# Patient Record
Sex: MALE | Race: WHITE | NOT HISPANIC OR LATINO | Employment: OTHER | URBAN - METROPOLITAN AREA
[De-identification: names, ages, dates, MRNs, and addresses within clinical notes are randomized per-mention and may not be internally consistent; named-entity substitution may affect disease eponyms.]

---

## 2022-03-16 DIAGNOSIS — Z00.6 EXAMINATION FOR NORMAL COMPARISON FOR CLINICAL RESEARCH: Primary | ICD-10-CM

## 2022-03-21 ENCOUNTER — OFFICE VISIT (OUTPATIENT)
Dept: CARDIAC SURGERY | Facility: CLINIC | Age: 82
End: 2022-03-21

## 2022-03-21 VITALS
DIASTOLIC BLOOD PRESSURE: 50 MMHG | SYSTOLIC BLOOD PRESSURE: 128 MMHG | BODY MASS INDEX: 27.63 KG/M2 | HEIGHT: 70 IN | WEIGHT: 193 LBS | HEART RATE: 61 BPM | TEMPERATURE: 98.4 F | OXYGEN SATURATION: 98 %

## 2022-03-21 DIAGNOSIS — R26.89 LOSS OF BALANCE: ICD-10-CM

## 2022-03-21 DIAGNOSIS — I35.9 AORTIC VALVE DISORDER: Primary | ICD-10-CM

## 2022-03-21 DIAGNOSIS — I50.9 PLEURAL EFFUSION DUE TO CHF (CONGESTIVE HEART FAILURE): ICD-10-CM

## 2022-03-21 DIAGNOSIS — I35.0 AORTIC STENOSIS, SEVERE: ICD-10-CM

## 2022-03-21 DIAGNOSIS — I50.9 CHRONIC CONGESTIVE HEART FAILURE, UNSPECIFIED HEART FAILURE TYPE: ICD-10-CM

## 2022-03-21 DIAGNOSIS — I42.9 CARDIOMYOPATHY, UNSPECIFIED TYPE: Primary | ICD-10-CM

## 2022-03-21 PROCEDURE — 99204 OFFICE O/P NEW MOD 45 MIN: CPT | Performed by: THORACIC SURGERY (CARDIOTHORACIC VASCULAR SURGERY)

## 2022-03-21 RX ORDER — TAMSULOSIN HYDROCHLORIDE 0.4 MG/1
1 CAPSULE ORAL DAILY
COMMUNITY
Start: 2022-03-12 | End: 2022-06-22 | Stop reason: ALTCHOICE

## 2022-03-21 RX ORDER — TORSEMIDE 20 MG/1
20 TABLET ORAL DAILY PRN
COMMUNITY
Start: 2022-03-02

## 2022-03-21 RX ORDER — METOPROLOL SUCCINATE 50 MG/1
50 TABLET, EXTENDED RELEASE ORAL DAILY
COMMUNITY
Start: 2022-03-02 | End: 2022-05-17 | Stop reason: HOSPADM

## 2022-03-21 RX ORDER — SITAGLIPTIN 100 MG/1
100 TABLET, FILM COATED ORAL DAILY
COMMUNITY
Start: 2022-01-18

## 2022-03-21 RX ORDER — ATORVASTATIN CALCIUM 20 MG/1
TABLET, FILM COATED ORAL DAILY
Status: ON HOLD | COMMUNITY
Start: 2022-03-02 | End: 2022-04-08 | Stop reason: SDUPTHER

## 2022-03-21 RX ORDER — POTASSIUM CHLORIDE 750 MG/1
10 TABLET, FILM COATED, EXTENDED RELEASE ORAL AS NEEDED
COMMUNITY
Start: 2022-03-03

## 2022-03-21 RX ORDER — LEVOTHYROXINE SODIUM 0.07 MG/1
75 TABLET ORAL DAILY
COMMUNITY

## 2022-03-21 RX ORDER — SACUBITRIL AND VALSARTAN 24; 26 MG/1; MG/1
TABLET, FILM COATED ORAL 2 TIMES DAILY
COMMUNITY
Start: 2022-03-03 | End: 2022-04-08 | Stop reason: HOSPADM

## 2022-03-21 RX ORDER — OMEPRAZOLE 40 MG/1
40 CAPSULE, DELAYED RELEASE ORAL DAILY PRN
COMMUNITY
Start: 2022-02-07

## 2022-03-21 NOTE — PROGRESS NOTES
TAVR Pre-Op Checklist    Patient Name: Zack Alves y.o. [id number]  Residence: [place]    Referral Date: 3/21/22 : 1940   Ht: 70in (177.8 cm) Wt: 192# (87.5 kg)    Referring Cardiologist: AMBERLY Dao BMI: 27.69 kg/m2    Initial TTE date:EXTERNAL MEDICAL RECORDS - SCAN - ECHO REPORT 2021 [facility] (2022)    RADHA: 0.99 cm2  AV mean: 28 mmHg AV Vamx: 3.35  LVEF: 35-40%    Coordinator H&P:Progress Notes by Hortensia Courtney APRN (2022 11:00)    CT Surgery Consult:Progress Notes by Percy Green MD (2022 13:30)    STS Score: 2.04% Creatinine Clearance: 45 ml/min   Functional Assessment: C    Allergy (Contrast):Doxycycline and Levaquin [levofloxacin]  Pre-op meds: NA     Anticoagulated: No Last dose:NA Heparin or Lovenox Bridge: NA    CTA Date: CT Angio TAVR Chest Abdomen Pelvis (2022 12:59)    CTA 3D: CARDIOLOGY VISIT - SCAN - TAVR 3D MENSIO (2022)    Left Coronary Ht: 17.3  Right Coronary Ht: 18.5  Annulus Area: 551.6 mm2    CTA Important findings: Ascending aorta 4.6 cm, pancreatic ductal dilation and cystic changes.  MRI abdomen with pancreatic protocol scheduled for 22.    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3  PLTS:[id number]    P2Y12:236 HGA1C: 7.3 CXR:heart enlarged with mild chronic interstitial changes      TAVR Procedure Date: 5/16/22

## 2022-03-21 NOTE — PROGRESS NOTES
03/21/2022  Patient Information  [place] [address]   1940  'PCP/Referring Physician'  Provider, No Known  None  No ref. provider found    Chief Complaint   Patient presents with   • Consult     NP Belia Lopez PA-C for Valve Disease and Pleural Effusion-Complains of SOB       History of Present Illness:   The patient is an 81-year-old male who has been referred for evaluation of severe aortic stenosis.  1 year ago his mean gradient was about 28 mmHg with a peak in the 40s.  He has developed congestive heart failure and more shortness of breath. He also has had pleural effusions.  His symptoms have been more consistent with congestive heart failure than pneumonia.  He was started on Entresto and has felt much better.  He has been referred for consideration of TAVR at this time.      Patient Active Problem List   Diagnosis   • Aortic valve disorder   • Pleural effusion due to CHF (congestive heart failure) ([facility])     Past Medical History:   Diagnosis Date   • Anxiety    • Bleeding ulcer    • CHF (congestive heart failure) ([facility])    • Depression    • Diabetes mellitus ([facility])    • GERD (gastroesophageal reflux disease)    • Hemorrhoid    • History of blood transfusion    • Hyperlipidemia    • Hypertension    • Hypothyroidism    • Pleural effusion      Past Surgical History:   Procedure Laterality Date   • GALLBLADDER SURGERY     • UMBILICAL HERNIA REPAIR         Current Outpatient Medications:   •  atorvastatin (LIPITOR) 20 MG tablet, Daily., Disp: , Rfl:   •  Entresto 24-26 MG tablet, 2 (Two) Times a Day., Disp: , Rfl:   •  Januvia 100 MG tablet, Daily., Disp: , Rfl:   •  levothyroxine (SYNTHROID, LEVOTHROID) 75 MCG tablet, Take 75 mcg by mouth Daily., Disp: , Rfl:   •  metoprolol succinate XL (TOPROL-XL) 50 MG 24 hr tablet, Daily., Disp: , Rfl:   •  omeprazole (priLOSEC) 40 MG capsule, Daily., Disp:  , Rfl:   •  potassium chloride 10 MEQ CR tablet, Take 10 mEq by mouth Daily., Disp: , Rfl:   •  tamsulosin (FLOMAX) 0.4 MG capsule 24 hr capsule, Daily., Disp: , Rfl:   •  torsemide (DEMADEX) 10 MG tablet, Daily., Disp: , Rfl:   Allergies   Allergen Reactions   • Doxycycline Other (See Comments)     Uknown   • Levaquin [Levofloxacin] Swelling     Social History     Socioeconomic History   • Marital status:    • Number of children: 1   Tobacco Use   • Smoking status: Former Smoker     Packs/day: 0.25     Years: 0.50     Pack years: 0.12     Quit date:      Years since quittin.2   • Smokeless tobacco: Never Used   Vaping Use   • Vaping Use: Never used   Substance and Sexual Activity   • Alcohol use: Not Currently   • Drug use: Never     Family History   Problem Relation Age of Onset   • Heart attack Mother    • Ulcers Father      Review of Systems   Constitutional: Negative for chills, fever, malaise/fatigue, night sweats and weight loss.   HENT: Negative for hearing loss, odynophagia and sore throat.    Cardiovascular: Positive for chest pain, dyspnea on exertion and leg swelling. Negative for orthopnea and palpitations.   Respiratory: Positive for shortness of breath and wheezing. Negative for cough and hemoptysis.    Endocrine: Negative for cold intolerance, heat intolerance, polydipsia, polyphagia and polyuria.   Hematologic/Lymphatic: Bruises/bleeds easily.   Skin: Negative for itching and rash.   Musculoskeletal: Positive for back pain. Negative for joint pain, joint swelling and myalgias.   Gastrointestinal: Positive for diarrhea. Negative for abdominal pain, constipation, hematemesis, hematochezia, melena, nausea and vomiting.   Genitourinary: Negative for dysuria, frequency and hematuria.   Neurological: Negative for focal weakness, headaches, numbness and seizures.   Psychiatric/Behavioral: Positive for depression. Negative for suicidal ideas. The patient is nervous/anxious.    All other  "systems reviewed and are negative.    Vitals:    03/21/22 1348   BP: 128/50   BP Location: Left arm   Patient Position: Sitting   Pulse: 61   Temp: 98.4 °F (36.9 °C)   SpO2: 98%   Weight: 87.5 kg (193 lb)   Height: 177.8 cm (70\")      Physical Exam  Vitals and nursing note reviewed.   Constitutional:       General: He is not in acute distress.     Appearance: He is well-developed.   HENT:      Head: Normocephalic.   Eyes:      Conjunctiva/sclera: Conjunctivae normal.      Pupils: Pupils are equal, round, and reactive to light.   Neck:      Thyroid: No thyroid mass or thyromegaly.   Cardiovascular:      Rate and Rhythm: Normal rate.      Heart sounds: Murmur heard.     No friction rub. No gallop.   Pulmonary:      Breath sounds: No wheezing, rhonchi or rales.   Abdominal:      General: Bowel sounds are normal. There is no distension.      Palpations: Abdomen is soft. There is no mass.      Tenderness: There is no abdominal tenderness.   Musculoskeletal:         General: No deformity. Normal range of motion.      Cervical back: Normal range of motion.   Skin:     Findings: No petechiae.      Nails: There is no clubbing.   Neurological:      Mental Status: He is oriented to person, place, and time.      Cranial Nerves: No cranial nerve deficit.      Sensory: No sensory deficit.   Psychiatric:         Behavior: Behavior normal.         The ROS, past medical history, surgical history, family history, social history and vitals were reviewed by myself and corrected as needed.      Labs/Imaging:  I have obtained and reviewed the medical records from [facility], including the echocardiogram demonstrating congestive heart failure.    Assessment/Plan:   The patient is an 81-year-old male who has developed increasing symptoms of congestive heart failure.  He has evidence of at least, moderate if not severe, aortic stenosis.  He has a very loud murmur.  A year ago his mean gradient was approaching 30.  I suspect he has severe " aortic stenosis.  We will proceed with a work-up for him for a possible TAVR evaluation.  I discussed this fully with him and he is agreeable.  He has no further questions.  I would like to thank you for this consultation.    Patient Active Problem List   Diagnosis   • Aortic valve disorder   • Pleural effusion due to CHF (congestive heart failure) ([facility])       CC: JESSICA Acuña editing for Percy Green MD      I, Percy Green MD, have read and agree with the editing done by Julianne Torres, .

## 2022-03-22 PROBLEM — I50.9 PLEURAL EFFUSION DUE TO CHF (CONGESTIVE HEART FAILURE): Status: ACTIVE | Noted: 2022-03-22

## 2022-03-22 PROBLEM — I35.9 AORTIC VALVE DISORDER: Status: ACTIVE | Noted: 2022-03-22

## 2022-03-31 PROBLEM — I42.9 CARDIOMYOPATHY: Status: ACTIVE | Noted: 2022-03-31

## 2022-03-31 PROBLEM — I35.0 AORTIC STENOSIS, SEVERE: Status: ACTIVE | Noted: 2022-03-31

## 2022-04-04 ENCOUNTER — PREP FOR SURGERY (OUTPATIENT)
Dept: OTHER | Facility: HOSPITAL | Age: 82
End: 2022-04-04

## 2022-04-04 DIAGNOSIS — R79.9 ABNORMAL FINDING OF BLOOD CHEMISTRY, UNSPECIFIED: ICD-10-CM

## 2022-04-04 DIAGNOSIS — I35.0 AORTIC STENOSIS, SEVERE: Primary | ICD-10-CM

## 2022-04-04 RX ORDER — SODIUM CHLORIDE 0.9 % (FLUSH) 0.9 %
3-10 SYRINGE (ML) INJECTION AS NEEDED
Status: CANCELLED | OUTPATIENT
Start: 2022-04-04

## 2022-04-04 RX ORDER — SODIUM CHLORIDE 9 MG/ML
100 INJECTION, SOLUTION INTRAVENOUS CONTINUOUS
Status: CANCELLED | OUTPATIENT
Start: 2022-04-04 | End: 2022-04-04

## 2022-04-04 RX ORDER — SODIUM CHLORIDE 0.9 % (FLUSH) 0.9 %
3 SYRINGE (ML) INJECTION EVERY 12 HOURS SCHEDULED
Status: CANCELLED | OUTPATIENT
Start: 2022-04-04

## 2022-04-05 ENCOUNTER — CLINICAL SUPPORT NO REQUIREMENTS (OUTPATIENT)
Dept: PREADMISSION TESTING | Facility: HOSPITAL | Age: 82
End: 2022-04-05

## 2022-04-05 DIAGNOSIS — I35.0 AORTIC STENOSIS, SEVERE: ICD-10-CM

## 2022-04-05 LAB
FLUAV RNA RESP QL NAA+PROBE: NOT DETECTED
FLUBV RNA RESP QL NAA+PROBE: NOT DETECTED
RSV RNA NPH QL NAA+NON-PROBE: NOT DETECTED
SARS-COV-2 RNA RESP QL NAA+PROBE: NOT DETECTED

## 2022-04-05 PROCEDURE — 87637 SARSCOV2&INF A&B&RSV AMP PRB: CPT

## 2022-04-05 PROCEDURE — C9803 HOPD COVID-19 SPEC COLLECT: HCPCS

## 2022-04-08 ENCOUNTER — HOSPITAL ENCOUNTER (OUTPATIENT)
Facility: HOSPITAL | Age: 82
Setting detail: HOSPITAL OUTPATIENT SURGERY
Discharge: HOME | End: 2022-04-08
Attending: INTERNAL MEDICINE | Admitting: INTERNAL MEDICINE

## 2022-04-08 ENCOUNTER — HOSPITAL ENCOUNTER (OUTPATIENT)
Dept: CARDIOLOGY | Facility: HOSPITAL | Age: 82
Discharge: HOME | End: 2022-04-08

## 2022-04-08 VITALS
TEMPERATURE: 98 F | WEIGHT: 192.2 LBS | BODY MASS INDEX: 27.52 KG/M2 | SYSTOLIC BLOOD PRESSURE: 100 MMHG | DIASTOLIC BLOOD PRESSURE: 62 MMHG | RESPIRATION RATE: 16 BRPM | HEART RATE: 74 BPM | HEIGHT: 70 IN | OXYGEN SATURATION: 96 %

## 2022-04-08 DIAGNOSIS — I42.9 CARDIOMYOPATHY, UNSPECIFIED TYPE: ICD-10-CM

## 2022-04-08 DIAGNOSIS — I42.0 DILATED CARDIOMYOPATHY: ICD-10-CM

## 2022-04-08 DIAGNOSIS — I35.0 AORTIC STENOSIS, SEVERE: ICD-10-CM

## 2022-04-08 DIAGNOSIS — R94.31 ABNORMAL ELECTROCARDIOGRAM (ECG) (EKG): ICD-10-CM

## 2022-04-08 DIAGNOSIS — I50.9 PLEURAL EFFUSION DUE TO CHF (CONGESTIVE HEART FAILURE): ICD-10-CM

## 2022-04-08 DIAGNOSIS — I50.9 CHRONIC CONGESTIVE HEART FAILURE, UNSPECIFIED HEART FAILURE TYPE: ICD-10-CM

## 2022-04-08 DIAGNOSIS — I35.9 AORTIC VALVE DISORDER: Primary | ICD-10-CM

## 2022-04-08 LAB
ANION GAP SERPL CALCULATED.3IONS-SCNC: 12 MMOL/L (ref 5–15)
BASOPHILS # BLD AUTO: 0.02 10*3/MM3 (ref 0–0.2)
BASOPHILS NFR BLD AUTO: 0.2 % (ref 0–1.5)
BUN SERPL-MCNC: 29 MG/DL (ref 8–23)
BUN/CREAT SERPL: 18.5 (ref 7–25)
CALCIUM SPEC-SCNC: 9.3 MG/DL (ref 8.6–10.5)
CHLORIDE SERPL-SCNC: 105 MMOL/L (ref 98–107)
CHOLEST SERPL-MCNC: 199 MG/DL (ref 0–200)
CO2 SERPL-SCNC: 26 MMOL/L (ref 22–29)
CREAT SERPL-MCNC: 1.57 MG/DL (ref 0.76–1.27)
DEPRECATED RDW RBC AUTO: 46.8 FL (ref 37–54)
EGFRCR SERPLBLD CKD-EPI 2021: 43.7 ML/MIN/1.73
EOSINOPHIL # BLD AUTO: 0.1 10*3/MM3 (ref 0–0.4)
EOSINOPHIL NFR BLD AUTO: 1.2 % (ref 0.3–6.2)
ERYTHROCYTE [DISTWIDTH] IN BLOOD BY AUTOMATED COUNT: 13.2 % (ref 12.3–15.4)
GLUCOSE SERPL-MCNC: 143 MG/DL (ref 65–99)
HCT VFR BLD AUTO: 44.2 % (ref 37.5–51)
HDLC SERPL-MCNC: 54 MG/DL (ref 40–60)
HGB BLD-MCNC: 14.5 G/DL (ref 13–17.7)
IMM GRANULOCYTES # BLD AUTO: 0.02 10*3/MM3 (ref 0–0.05)
IMM GRANULOCYTES NFR BLD AUTO: 0.2 % (ref 0–0.5)
LDLC SERPL CALC-MCNC: 133 MG/DL (ref 0–100)
LDLC/HDLC SERPL: 2.45 {RATIO}
LYMPHOCYTES # BLD AUTO: 1.59 10*3/MM3 (ref 0.7–3.1)
LYMPHOCYTES NFR BLD AUTO: 19.6 % (ref 19.6–45.3)
MAGNESIUM SERPL-MCNC: 2.1 MG/DL (ref 1.6–2.4)
MCH RBC QN AUTO: 31.4 PG (ref 26.6–33)
MCHC RBC AUTO-ENTMCNC: 32.8 G/DL (ref 31.5–35.7)
MCV RBC AUTO: 95.7 FL (ref 79–97)
MONOCYTES # BLD AUTO: 0.38 10*3/MM3 (ref 0.1–0.9)
MONOCYTES NFR BLD AUTO: 4.7 % (ref 5–12)
NEUTROPHILS NFR BLD AUTO: 6.01 10*3/MM3 (ref 1.7–7)
NEUTROPHILS NFR BLD AUTO: 74.1 % (ref 42.7–76)
NRBC BLD AUTO-RTO: 0 /100 WBC (ref 0–0.2)
PLATELET # BLD AUTO: 171 10*3/MM3 (ref 140–450)
PMV BLD AUTO: 11.4 FL (ref 6–12)
POTASSIUM SERPL-SCNC: 4.2 MMOL/L (ref 3.5–5.2)
RBC # BLD AUTO: 4.62 10*6/MM3 (ref 4.14–5.8)
SODIUM SERPL-SCNC: 143 MMOL/L (ref 136–145)
TRIGL SERPL-MCNC: 63 MG/DL (ref 0–150)
VLDLC SERPL-MCNC: 12 MG/DL (ref 5–40)
WBC NRBC COR # BLD: 8.12 10*3/MM3 (ref 3.4–10.8)

## 2022-04-08 PROCEDURE — 93312 ECHO TRANSESOPHAGEAL: CPT | Performed by: INTERNAL MEDICINE

## 2022-04-08 PROCEDURE — 85025 COMPLETE CBC W/AUTO DIFF WBC: CPT | Performed by: NURSE PRACTITIONER

## 2022-04-08 PROCEDURE — 93454 CORONARY ARTERY ANGIO S&I: CPT | Performed by: INTERNAL MEDICINE

## 2022-04-08 PROCEDURE — 80061 LIPID PANEL: CPT | Performed by: NURSE PRACTITIONER

## 2022-04-08 PROCEDURE — 99204 OFFICE O/P NEW MOD 45 MIN: CPT | Performed by: INTERNAL MEDICINE

## 2022-04-08 PROCEDURE — 93325 DOPPLER ECHO COLOR FLOW MAPG: CPT

## 2022-04-08 PROCEDURE — 83735 ASSAY OF MAGNESIUM: CPT | Performed by: NURSE PRACTITIONER

## 2022-04-08 PROCEDURE — C1894 INTRO/SHEATH, NON-LASER: HCPCS | Performed by: INTERNAL MEDICINE

## 2022-04-08 PROCEDURE — 25010000002 FENTANYL CITRATE (PF) 50 MCG/ML SOLUTION: Performed by: INTERNAL MEDICINE

## 2022-04-08 PROCEDURE — 93325 DOPPLER ECHO COLOR FLOW MAPG: CPT | Performed by: INTERNAL MEDICINE

## 2022-04-08 PROCEDURE — 80048 BASIC METABOLIC PNL TOTAL CA: CPT | Performed by: NURSE PRACTITIONER

## 2022-04-08 PROCEDURE — 93320 DOPPLER ECHO COMPLETE: CPT | Performed by: INTERNAL MEDICINE

## 2022-04-08 PROCEDURE — 99152 MOD SED SAME PHYS/QHP 5/>YRS: CPT | Performed by: INTERNAL MEDICINE

## 2022-04-08 PROCEDURE — 93320 DOPPLER ECHO COMPLETE: CPT

## 2022-04-08 PROCEDURE — 0 IOPAMIDOL PER 1 ML: Performed by: INTERNAL MEDICINE

## 2022-04-08 PROCEDURE — 93312 ECHO TRANSESOPHAGEAL: CPT

## 2022-04-08 PROCEDURE — 25010000002 MIDAZOLAM PER 1 MG: Performed by: INTERNAL MEDICINE

## 2022-04-08 RX ORDER — SACUBITRIL AND VALSARTAN 49; 51 MG/1; MG/1
1 TABLET, FILM COATED ORAL 2 TIMES DAILY
Qty: 60 TABLET | Refills: 11 | Status: ON HOLD | OUTPATIENT
Start: 2022-04-08 | End: 2022-05-17 | Stop reason: SDUPTHER

## 2022-04-08 RX ORDER — LOSARTAN POTASSIUM 50 MG/1
50 TABLET ORAL DAILY
COMMUNITY
End: 2022-04-08 | Stop reason: HOSPADM

## 2022-04-08 RX ORDER — SODIUM CHLORIDE 0.9 % (FLUSH) 0.9 %
3 SYRINGE (ML) INJECTION EVERY 12 HOURS SCHEDULED
Status: DISCONTINUED | OUTPATIENT
Start: 2022-04-08 | End: 2022-04-08 | Stop reason: HOSPADM

## 2022-04-08 RX ORDER — MIDAZOLAM HYDROCHLORIDE 1 MG/ML
INJECTION INTRAMUSCULAR; INTRAVENOUS
Status: DISCONTINUED
Start: 2022-04-08 | End: 2022-04-08 | Stop reason: HOSPADM

## 2022-04-08 RX ORDER — LIDOCAINE HYDROCHLORIDE 10 MG/ML
INJECTION, SOLUTION EPIDURAL; INFILTRATION; INTRACAUDAL; PERINEURAL AS NEEDED
Status: DISCONTINUED | OUTPATIENT
Start: 2022-04-08 | End: 2022-04-08 | Stop reason: HOSPADM

## 2022-04-08 RX ORDER — SODIUM CHLORIDE 0.9 % (FLUSH) 0.9 %
3-10 SYRINGE (ML) INJECTION AS NEEDED
Status: DISCONTINUED | OUTPATIENT
Start: 2022-04-08 | End: 2022-04-08 | Stop reason: HOSPADM

## 2022-04-08 RX ORDER — FENTANYL CITRATE 50 UG/ML
INJECTION, SOLUTION INTRAMUSCULAR; INTRAVENOUS
Status: COMPLETED | OUTPATIENT
Start: 2022-04-08 | End: 2022-04-08

## 2022-04-08 RX ORDER — NALOXONE HYDROCHLORIDE 0.4 MG/ML
INJECTION, SOLUTION INTRAMUSCULAR; INTRAVENOUS; SUBCUTANEOUS
Status: DISCONTINUED
Start: 2022-04-08 | End: 2022-04-08 | Stop reason: WASHOUT

## 2022-04-08 RX ORDER — MIDAZOLAM HYDROCHLORIDE 1 MG/ML
INJECTION INTRAMUSCULAR; INTRAVENOUS
Status: COMPLETED | OUTPATIENT
Start: 2022-04-08 | End: 2022-04-08

## 2022-04-08 RX ORDER — FENTANYL CITRATE 50 UG/ML
INJECTION, SOLUTION INTRAMUSCULAR; INTRAVENOUS
Status: DISCONTINUED
Start: 2022-04-08 | End: 2022-04-08 | Stop reason: HOSPADM

## 2022-04-08 RX ORDER — SODIUM CHLORIDE 9 MG/ML
100 INJECTION, SOLUTION INTRAVENOUS CONTINUOUS
Status: ACTIVE | OUTPATIENT
Start: 2022-04-08 | End: 2022-04-08

## 2022-04-08 RX ORDER — MIDAZOLAM HYDROCHLORIDE 1 MG/ML
INJECTION INTRAMUSCULAR; INTRAVENOUS
Status: DISCONTINUED
Start: 2022-04-08 | End: 2022-04-08 | Stop reason: WASHOUT

## 2022-04-08 RX ORDER — ATORVASTATIN CALCIUM 20 MG/1
40 TABLET, FILM COATED ORAL DAILY
Qty: 90 TABLET | Refills: 3 | Status: SHIPPED | OUTPATIENT
Start: 2022-04-08 | End: 2022-04-15

## 2022-04-08 RX ORDER — FLUMAZENIL 0.1 MG/ML
INJECTION INTRAVENOUS
Status: DISCONTINUED
Start: 2022-04-08 | End: 2022-04-08 | Stop reason: WASHOUT

## 2022-04-08 RX ADMIN — FENTANYL CITRATE 50 MCG: 50 INJECTION, SOLUTION INTRAMUSCULAR; INTRAVENOUS at 13:26

## 2022-04-08 RX ADMIN — FENTANYL CITRATE 50 MCG: 50 INJECTION, SOLUTION INTRAMUSCULAR; INTRAVENOUS at 13:19

## 2022-04-08 RX ADMIN — MIDAZOLAM 2 MG: 1 INJECTION INTRAMUSCULAR; INTRAVENOUS at 13:19

## 2022-04-08 RX ADMIN — METHOHEXITAL SODIUM 20 MG: 500 INJECTION, POWDER, LYOPHILIZED, FOR SOLUTION INTRAMUSCULAR; INTRAVENOUS; RECTAL at 13:42

## 2022-04-08 RX ADMIN — MIDAZOLAM 2 MG: 1 INJECTION INTRAMUSCULAR; INTRAVENOUS at 13:26

## 2022-04-08 RX ADMIN — METHOHEXITAL SODIUM 20 MG: 500 INJECTION, POWDER, LYOPHILIZED, FOR SOLUTION INTRAMUSCULAR; INTRAVENOUS; RECTAL at 13:37

## 2022-04-08 RX ADMIN — METHOHEXITAL SODIUM 20 MG: 500 INJECTION, POWDER, LYOPHILIZED, FOR SOLUTION INTRAMUSCULAR; INTRAVENOUS; RECTAL at 13:27

## 2022-04-08 NOTE — DISCHARGE INSTR - ACTIVITY
Echo Stress test visit:     Follow instructions from your health care provider about eating or drinking restrictions. You may be asked to:  Avoid all forms of caffeine for 24 hours before your test, or as told by your health care provider. This includes:  Beverages such as coffee, cola, and tea.  Foods such as chocolate.  Certain pain medicines.  Caffeine-free or decaffeinated products. These still have small amounts of caffeine.  Stop eating and drinking for several hours before the test.  Wear loose, comfortable clothing.  Avoid activities that take a lot of effort, such as jogging, before the test.  Do not use any products that contain nicotine or tobacco for at least 4 hours before the test. These products include cigarettes, chewing tobacco, and vaping devices, such as e-cigarettes. If you need help quitting, ask your health care provider.

## 2022-04-08 NOTE — PROGRESS NOTES
TAVR URVASHI    Spoke with Dr. Reynoso after cardiac cath/miley.  Patient will need dobutamine stress echo to assess AV gradients and SV prior to TAVR.  Will request this study for 4/27 when he returns for CTA and carotid.    Hortensia LANDIN

## 2022-04-08 NOTE — H&P
[facility] Consult/H&P Note      Referring Provider: Adwoa Reynoso,*  Primary Provider:  Provider, No Known  Reason for Consultation: Aortic stenosis    PROBLEM LIST:  1. Aortic stenosis  a. Echo 4/2021, [facility]: EF 35-40%, grade II diastolic dysfunction, moderate to severe MR, mild AI, moderate AS (mean PG 28.08 mmHg, peak 44.64 mmHg), moderate TR, RVSP 64.85 mmHg.    2. Systolic heart failure  a. Echo 4/2021, [facility]: EF 35-40%, grade II diastolic dysfunction, moderate to severe MR, mild AI, moderate AS (mean PG 28.08 mmHg, peak 44.64 mmHg), moderate TR, RVSP 64.85 mmHg.    3. Hypertension  4. Hyperlipidemia  5. Hypothyroidism  6. GERD  7. Diabetes  8. Depression      HISTORY OF PRESENT ILLNESS:  Zack Webster is a 81 y.o. male with the above noted pmhx who presents today for LHC plus/minus CBI and ALVINO as part of TAVR workup. He has a known history of AS. He had an echo April 2021 which revealed moderate to severe MR and moderate AS with mean PG of 28.08 mmHg as well as a reduced EF of 35-40%. He was seen by Dr. Green on 3/21/22 at which time he felt he was a reasonable candidate for TAVR. He was treated for acute on chronic systolic heart failure with diuretics in Jan. He reports that he has continued to have some lower extremity edema. No PND, orthopnea. He reports being more short of breath, worse with exertion, weak, decreased activity tolerance. No LH, dizziness. Some near syncope for several years. Does not appear to be associated with exertion. No syncope. He denies any chest pain. He reports having a heart cath several years ago and was told that he had only minor blockages.       REVIEW OF SYSTEMS  Pertinent positives are listed in the HPI and all other ROS are negative.      SOCIAL HISTORY:   reports that he quit smoking about 52 years ago. He has a 0.13 pack-year smoking history. He has never used smokeless tobacco. He reports previous alcohol use. He reports that he does not use drugs.      FAMILY HISTORY:  family history includes Heart attack in his mother; Ulcers in his father.     CURRENT MEDICATIONS:      Current Facility-Administered Medications:   •  sodium chloride 0.9 % flush 3 mL, 3 mL, Intravenous, Q12H, Kuns-Larson, Fatou B, APRN  •  sodium chloride 0.9 % flush 3-10 mL, 3-10 mL, Intravenous, PRN, Kuns-Larson, Fatou B, APRN  •  sodium chloride 0.9 % infusion, 100 mL/hr, Intravenous, Continuous, Kuns-Larson, Fatou B, APRN     Allergies:  Doxycycline and Levaquin [levofloxacin]    Objective     Vital Sign Min/Max for last 24 hours  No data recorded   No data recorded   No data recorded   No data recorded   No data recorded   No data recorded   No data recorded         PHYSICAL EXAM:  GENERAL: This is a well-developed, well-nourished, male who is in no acute distress.   SKIN: Pink and warm without rash or abnormality noted.   HEENT: Head is normocephalic and atraumatic. Pupils are equal and reactive to light bilaterally. Mucous membranes are pink and moist.   NECK: Supple without lymphadenopathy or thyromegaly. There is no jugular venous distention at 30°.  LUNGS: Clear to auscultation bilaterally without wheezing, rhonchi, or rales noted.   CARDIOVASCULAR: The heart has a regular rate with a normal S1 and S2. There is 3/6 JONATHAN at RUSB, no gallop, rub, or click appreciated. The PMI is nondisplaced. Carotid upstrokes are 2+ and symmetrical without bruits.   ABDOMEN: Soft and nondistended with positive bowel sounds x4. The patient denies tenderness of palpitation.   MUSCULOSKELETAL: There are no obvious bony abnormalities. Normal range of tenderness to palpation.   NEUROLOGICAL: Nonfocal. Alert and oriented x3.   PERIPHERAL VASCULAR: Femoral pulses are 2+ and symmetrical without bruits. Posterior tibial and dorsalis pedis pulses are 2+ and symmetrical. There is trace to 1+ peripheral edema.   PSYCH: Normal mood and affect.      EKG:    Tele: NSR    LABS:    No results found for: WBC, HGB,  HCT, MCV, PLT  No results found for: GLUCOSE, BUN, CREATININE, EGFRIFNONA, EGFRIFAFRI, BCR, K, CO2, CALCIUM, PROTENTOTREF, ALBUMIN, LABIL2, BILIRUBIN, AST, ALT  No results found for: CKTOTAL, CKMB, CKMBINDEX, TROPONINI, TROPONINT  No results found for: INR, PROTIME  No results found for: CHOL, CHLPL, TRIG, HDL, LDL, LDLDIRECT     Results Review: I reviewed the patient's new clinical results.      ASSESSMENT:    1.  Aortic stenosis  a. Echo 4/2021, [facility]: EF 35-40%, grade II diastolic dysfunction, moderate to severe MR, mild AI, moderate AS (mean PG 28.08 mmHg, peak 44.64 mmHg), moderate TR, RVSP 64.85 mmHg.    b. Patient presents today for [facility] plus/minus CBI and ALVINO as part of TAVR workup. The risks, benefits, and alternatives of the procedure have been reviewed and the patient wishes to proceed.   2. Systolic heart failure  a. Echo 4/2021, [facility]: EF 35-40%, grade II diastolic dysfunction, moderate to severe MR, mild AI, moderate AS (mean PG 28.08 mmHg, peak 44.64 mmHg), moderate TR, RVSP 64.85 mmHg.    3. Hypertension  4. Hyperlipidemia  5. Diabetes    PLAN:    1. Proceed as planned      I discussed the patient's findings and my recommendations with patient.    Scribed for Adwoa Reynoso MD by URVASHI Gomez. 4/8/2022  12:33 EDT    I Adwoa Reynoso MD personally performed the services described in this documentation as scribed by the above individual in my presence, and it is both accurate and complete.    Adwoa Reynoso MD, [facility]

## 2022-04-11 LAB
ASCENDING AORTA: 4.2 CM
BH CV ECHO MEAS - AO MAX PG (FULL): 55.3 MMHG
BH CV ECHO MEAS - AO MAX PG: 53 MMHG
BH CV ECHO MEAS - AO MEAN PG (FULL): 31.9 MMHG
BH CV ECHO MEAS - AO MEAN PG: 32 MMHG
BH CV ECHO MEAS - AO V2 MAX: 364 CM/SEC
BH CV ECHO MEAS - AO V2 MEAN: 270.6 CM/SEC
BH CV ECHO MEAS - AO V2 VTI: 94.8 CM
BH CV ECHO MEAS - ASC AORTA: 4.2 CM
BH CV ECHO MEAS - AVA(I,A): 0.82 CM^2
BH CV ECHO MEAS - AVA(I,D): 0.82 CM^2
BH CV ECHO MEAS - AVA(V,A): 0.72 CM^2
BH CV ECHO MEAS - AVA(V,D): 0.72 CM^2
BH CV ECHO MEAS - BSA(HAYCOCK): 2.1 M^2
BH CV ECHO MEAS - BSA: 2.1 M^2
BH CV ECHO MEAS - BZI_BMI: 27.7 KILOGRAMS/M^2
BH CV ECHO MEAS - BZI_METRIC_HEIGHT: 177.8 CM
BH CV ECHO MEAS - BZI_METRIC_WEIGHT: 87.5 KG
BH CV ECHO MEAS - LV MAX PG: 1.7 MMHG
BH CV ECHO MEAS - LV MEAN PG: 0.98 MMHG
BH CV ECHO MEAS - LV V1 MAX: 65.7 CM/SEC
BH CV ECHO MEAS - LV V1 MEAN: 45.8 CM/SEC
BH CV ECHO MEAS - LV V1 VTI: 18.9 CM
BH CV ECHO MEAS - LVOT AREA (M): 4.2 CM^2
BH CV ECHO MEAS - LVOT AREA: 4.1 CM^2
BH CV ECHO MEAS - LVOT DIAM: 2.3 CM
BH CV ECHO MEAS - MV MAX PG: 5.4 MMHG
BH CV ECHO MEAS - MV MEAN PG: 2.9 MMHG
BH CV ECHO MEAS - MV V2 MAX: 116.4 CM/SEC
BH CV ECHO MEAS - MV V2 MEAN: 79.9 CM/SEC
BH CV ECHO MEAS - MV V2 VTI: 36.1 CM
BH CV ECHO MEAS - MVA(VTI): 2.2 CM^2
BH CV ECHO MEAS - SI(LVOT): 37.9 ML/M^2
BH CV ECHO MEAS - SV(LVOT): 78 ML
BH CV VAS BP LEFT ARM: NORMAL MMHG
LV EF 2D ECHO EST: 25 %
MAXIMAL PREDICTED HEART RATE: 138 BPM
STRESS TARGET HR: 117 BPM

## 2022-04-15 RX ORDER — ATORVASTATIN CALCIUM 40 MG/1
40 TABLET, FILM COATED ORAL DAILY
Qty: 90 TABLET | Refills: 3 | Status: ON HOLD | OUTPATIENT
Start: 2022-04-15 | End: 2022-05-17 | Stop reason: SDUPTHER

## 2022-04-27 ENCOUNTER — HOSPITAL ENCOUNTER (OUTPATIENT)
Dept: CARDIOLOGY | Facility: HOSPITAL | Age: 82
Discharge: HOME | End: 2022-04-27

## 2022-04-27 ENCOUNTER — HOSPITAL ENCOUNTER (OUTPATIENT)
Dept: CT IMAGING | Facility: HOSPITAL | Age: 82
Discharge: HOME | End: 2022-04-27

## 2022-04-27 ENCOUNTER — APPOINTMENT (OUTPATIENT)
Dept: CARDIOLOGY | Facility: HOSPITAL | Age: 82
End: 2022-04-27

## 2022-04-27 VITALS
RESPIRATION RATE: 20 BRPM | WEIGHT: 192 LBS | DIASTOLIC BLOOD PRESSURE: 95 MMHG | OXYGEN SATURATION: 98 % | BODY MASS INDEX: 27.55 KG/M2 | SYSTOLIC BLOOD PRESSURE: 125 MMHG | HEART RATE: 72 BPM

## 2022-04-27 VITALS
HEIGHT: 70 IN | SYSTOLIC BLOOD PRESSURE: 115 MMHG | BODY MASS INDEX: 27.49 KG/M2 | OXYGEN SATURATION: 97 % | DIASTOLIC BLOOD PRESSURE: 62 MMHG | HEART RATE: 68 BPM | WEIGHT: 192 LBS

## 2022-04-27 VITALS — WEIGHT: 192.02 LBS | HEIGHT: 70 IN | BODY MASS INDEX: 27.49 KG/M2

## 2022-04-27 DIAGNOSIS — R94.31 ABNORMAL ELECTROCARDIOGRAM (ECG) (EKG): ICD-10-CM

## 2022-04-27 DIAGNOSIS — I50.9 PLEURAL EFFUSION DUE TO CHF (CONGESTIVE HEART FAILURE): ICD-10-CM

## 2022-04-27 DIAGNOSIS — I42.0 DILATED CARDIOMYOPATHY: ICD-10-CM

## 2022-04-27 DIAGNOSIS — I35.0 AORTIC STENOSIS, SEVERE: ICD-10-CM

## 2022-04-27 DIAGNOSIS — R26.89 LOSS OF BALANCE: ICD-10-CM

## 2022-04-27 DIAGNOSIS — I50.9 CHRONIC CONGESTIVE HEART FAILURE, UNSPECIFIED HEART FAILURE TYPE: ICD-10-CM

## 2022-04-27 LAB
BH CV DOB AS SEV FIRST STAGE AO VEL: 4
BH CV DOB AS SEV FIRST STAGE AO VTI: 104
BH CV DOB AS SEV FIRST STAGE AV MEAN: 38
BH CV DOB AS SEV FIRST STAGE AV PEAK: 58
BH CV DOB AS SEV FIRST STAGE AVA: 0.53
BH CV DOB AS SEV FIRST STAGE HR: 66
BH CV DOB AS SEV FIRST STAGE LVOT VEL: 1
BH CV DOB AS SEV FIRST STAGE LVOT VTI: 16
BH CV DOB AS SEV FIRST STAGE MICS: 5
BH CV DOB AS SEV FIRST STAGE SV: 55
BH CV DOB AS SEV RESTING AO VEL: 4
BH CV DOB AS SEV RESTING AO VTI: 90
BH CV DOB AS SEV RESTING AV MEAN: 38
BH CV DOB AS SEV RESTING AV PEAK: 56
BH CV DOB AS SEV RESTING AVA: 0.63
BH CV DOB AS SEV RESTING HR: 67
BH CV DOB AS SEV RESTING LVOT DIAM: 2.1
BH CV DOB AS SEV RESTING LVOT VEL: 1
BH CV DOB AS SEV RESTING LVOT VTI: 16
BH CV DOB AS SEV RESTING SV: 56
BH CV DOB AS SEV SEC STAGE AO VEL: 4
BH CV DOB AS SEV SEC STAGE AO VTI: 104
BH CV DOB AS SEV SEC STAGE AV MEAN: 38
BH CV DOB AS SEV SEC STAGE AV PEAK: 58
BH CV DOB AS SEV SEC STAGE AVA: 0.53
BH CV DOB AS SEV SEC STAGE HR: 68
BH CV DOB AS SEV SEC STAGE LVOT VEL: 1
BH CV DOB AS SEV SEC STAGE LVOT VTI: 23
BH CV DOB AS SEV SEC STAGE MICS: 10
BH CV DOB AS SEV SEC STAGE SV: 78
BH CV DOB AS SEV THRD STAGE AO VEL: 4
BH CV DOB AS SEV THRD STAGE AO VTI: 95
BH CV DOB AS SEV THRD STAGE AV MEAN: 35
BH CV DOB AS SEV THRD STAGE AV PEAK: 58
BH CV DOB AS SEV THRD STAGE AVA: 0.83
BH CV DOB AS SEV THRD STAGE HR: 61
BH CV DOB AS SEV THRD STAGE LVOT VEL: 1
BH CV DOB AS SEV THRD STAGE LVOT VTI: 17
BH CV DOB AS SEV THRD STAGE MICS: 15
BH CV DOB AS SEV THRD STAGE SV: 57
BH CV ECHO MEAS - AO MAX PG: 60.9 MMHG
BH CV ECHO MEAS - AO MEAN PG: 37.9 MMHG
BH CV ECHO MEAS - AO V2 MAX: 390.1 CM/SEC
BH CV ECHO MEAS - AO V2 VTI: 90.9 CM
BH CV ECHO MEAS - AVA(I,D): 0.72 CM2
BH CV ECHO MEAS - LV MAX PG: 3.1 MMHG
BH CV ECHO MEAS - LV MEAN PG: 1.79 MMHG
BH CV ECHO MEAS - LV V1 MAX: 87.6 CM/SEC
BH CV ECHO MEAS - LV V1 VTI: 19.1 CM
BH CV ECHO MEAS - LVOT AREA: 3.4 CM2
BH CV ECHO MEAS - LVOT DIAM: 2.09 CM
BH CV ECHO MEAS - SV(LVOT): 65.4 ML
BH CV STRESS BP STAGE 1: NORMAL
BH CV STRESS BP STAGE 2: NORMAL
BH CV STRESS BP STAGE 3: NORMAL
BH CV STRESS BP STAGE 4: NORMAL
BH CV STRESS DOSE DOBUTAMINE STAGE 1: 5
BH CV STRESS DOSE DOBUTAMINE STAGE 2: 10
BH CV STRESS DOSE DOBUTAMINE STAGE 3: 15
BH CV STRESS DOSE DOBUTAMINE STAGE 4: 20
BH CV STRESS DURATION MIN STAGE 1: 5
BH CV STRESS DURATION MIN STAGE 2: 7
BH CV STRESS DURATION MIN STAGE 3: 4
BH CV STRESS DURATION MIN STAGE 4: 7
BH CV STRESS DURATION SEC STAGE 1: 20
BH CV STRESS DURATION SEC STAGE 2: 58
BH CV STRESS DURATION SEC STAGE 3: 22
BH CV STRESS DURATION SEC STAGE 4: 43
BH CV STRESS HR STAGE 1: 60
BH CV STRESS HR STAGE 2: 63
BH CV STRESS HR STAGE 3: 68
BH CV STRESS HR STAGE 4: 69
BH CV STRESS O2 STAGE 1: 97
BH CV STRESS O2 STAGE 2: 97
BH CV STRESS O2 STAGE 3: 97
BH CV STRESS O2 STAGE 4: 96
BH CV STRESS PROTOCOL 1: NORMAL
BH CV STRESS RATE STAGE 1: 26
BH CV STRESS RATE STAGE 2: 52
BH CV STRESS RATE STAGE 3: 78
BH CV STRESS RATE STAGE 4: 105
BH CV STRESS RECOVERY BP: NORMAL MMHG
BH CV STRESS RECOVERY HR: 70 BPM
BH CV STRESS RECOVERY O2: 97 %
BH CV STRESS STAGE 1: 1
BH CV STRESS STAGE 2: 2
BH CV STRESS STAGE 3: 3
BH CV STRESS STAGE 4: 4
BH CV VAS BP RIGHT ARM: NORMAL MMHG
Lab: 0.62
Lab: 0.7
Lab: 1
Lab: 1
Lab: 16
Lab: 20
Lab: 22
Lab: 37
Lab: 4
Lab: 4
Lab: 41
Lab: 55
Lab: 60
Lab: 63
Lab: 67
Lab: 68
Lab: 75
Lab: 91
Lab: 93
MAXIMAL PREDICTED HEART RATE: 138 BPM
PERCENT MAX PREDICTED HR: 64.49 %
STRESS BASELINE BP: NORMAL MMHG
STRESS BASELINE HR: 71 BPM
STRESS O2 SAT REST: 97 %
STRESS PERCENT HR: 76 %
STRESS POST ESTIMATED WORKLOAD: 1 METS
STRESS POST EXERCISE DUR MIN: 25 MIN
STRESS POST EXERCISE DUR SEC: 23 SEC
STRESS POST O2 SAT PEAK: 97 %
STRESS POST PEAK BP: NORMAL MMHG
STRESS POST PEAK HR: 89 BPM
STRESS TARGET HR: 117 BPM

## 2022-04-27 PROCEDURE — 71275 CT ANGIOGRAPHY CHEST: CPT

## 2022-04-27 PROCEDURE — 93880 EXTRACRANIAL BILAT STUDY: CPT

## 2022-04-27 PROCEDURE — 93350 STRESS TTE ONLY: CPT

## 2022-04-27 PROCEDURE — 82565 ASSAY OF CREATININE: CPT

## 2022-04-27 PROCEDURE — 25010000002 DOBUTAMINE 250 MG/20ML SOLUTION 20 ML VIAL: Performed by: NURSE PRACTITIONER

## 2022-04-27 PROCEDURE — 93018 CV STRESS TEST I&R ONLY: CPT | Performed by: INTERNAL MEDICINE

## 2022-04-27 PROCEDURE — 93321 DOPPLER ECHO F-UP/LMTD STD: CPT

## 2022-04-27 PROCEDURE — 93325 DOPPLER ECHO COLOR FLOW MAPG: CPT | Performed by: INTERNAL MEDICINE

## 2022-04-27 PROCEDURE — 93880 EXTRACRANIAL BILAT STUDY: CPT | Performed by: INTERNAL MEDICINE

## 2022-04-27 PROCEDURE — 0 IOPAMIDOL PER 1 ML: Performed by: NURSE PRACTITIONER

## 2022-04-27 PROCEDURE — 93321 DOPPLER ECHO F-UP/LMTD STD: CPT | Performed by: INTERNAL MEDICINE

## 2022-04-27 PROCEDURE — 74174 CTA ABD&PLVS W/CONTRAST: CPT

## 2022-04-27 PROCEDURE — 93017 CV STRESS TEST TRACING ONLY: CPT

## 2022-04-27 PROCEDURE — 99215 OFFICE O/P EST HI 40 MIN: CPT | Performed by: NURSE PRACTITIONER

## 2022-04-27 PROCEDURE — 93350 STRESS TTE ONLY: CPT | Performed by: INTERNAL MEDICINE

## 2022-04-27 RX ORDER — LIDOCAINE HYDROCHLORIDE 10 MG/ML
5 INJECTION, SOLUTION EPIDURAL; INFILTRATION; INTRACAUDAL; PERINEURAL AS NEEDED
Status: DISCONTINUED | OUTPATIENT
Start: 2022-04-27 | End: 2022-04-28 | Stop reason: HOSPADM

## 2022-04-27 RX ORDER — METOPROLOL TARTRATE 50 MG/1
100 TABLET, FILM COATED ORAL ONCE AS NEEDED
Status: DISCONTINUED | OUTPATIENT
Start: 2022-04-27 | End: 2022-04-28 | Stop reason: HOSPADM

## 2022-04-27 RX ORDER — SODIUM CHLORIDE 0.9 % (FLUSH) 0.9 %
10 SYRINGE (ML) INJECTION AS NEEDED
Status: DISCONTINUED | OUTPATIENT
Start: 2022-04-27 | End: 2022-04-28 | Stop reason: HOSPADM

## 2022-04-27 RX ORDER — METOPROLOL TARTRATE 50 MG/1
50 TABLET, FILM COATED ORAL ONCE AS NEEDED
Status: DISCONTINUED | OUTPATIENT
Start: 2022-04-27 | End: 2022-04-28 | Stop reason: HOSPADM

## 2022-04-27 RX ORDER — SODIUM CHLORIDE 0.9 % (FLUSH) 0.9 %
3 SYRINGE (ML) INJECTION EVERY 12 HOURS SCHEDULED
Status: DISCONTINUED | OUTPATIENT
Start: 2022-04-27 | End: 2022-04-28 | Stop reason: HOSPADM

## 2022-04-27 RX ADMIN — DOBUTAMINE 5 MCG/KG/MIN: 12.5 INJECTION, SOLUTION, CONCENTRATE INTRAVENOUS at 09:06

## 2022-04-27 RX ADMIN — IOPAMIDOL 100 ML: 755 INJECTION, SOLUTION INTRAVENOUS at 13:00

## 2022-04-27 NOTE — PROGRESS NOTES
"TAVR APRN Evaluation    Zack Webster, 1940, [id number]     04/26/22    PCP: Lesli Dao MD  Primary Cardiologist: Adwoa Reynoso MD    TAVR Team:  1.  Percy Green MD  2.  Kirt Gonzales MD  3.  Adwoa Reynoso MD  4.  Catarina Pickens MD  5.  Rodney Chávez MD  6.  Hudson Rosa MD    Chief Complaint: Severe aortic stenosis/ CAD/ combined systolic & diastolic HF      Identification: This is a 81 y.o. year old male from [address] [place] [address].    History of Present Illness: Mr. Webster was referred for consideration of TAVR due to advanced age and notation of severe aortic stenosis.  Patient states his sympotms began last fall.  He was changing a tire on his vehicle and became extremely short of breath.  He presented to [facility] ER and was diagnosed with \"water in my lungs\".  Lasix was prescribed.  He followed up with [facility] and learned he had aortic valve disease.  Upon recent discussion about aortic valve replacement with Primary Care Provider, Dr. Dao, it was recommended he see Dr. Green.  Upon consultation with Dr. Green 3/21/22, TAVR was recommended.      The initial echocardiogram 4/2021 noted low ejection fraction.  Dr. Reynoso recommended dobutamine echocardiogram today.  This study confirmed he qualified for TAVR.  Patient is being seen today in [facility] to complete his functional assessment and provide discussion about TAVR procedure tentatively scheduled for mid May.      PROBLEM LIST:  1. Aortic stenosis  a. TTE 4/2021, [facility]: EF 35-40%, grade II diastolic dysfunction, moderate to severe MR, mild AI, moderate AS (mean PG 28.08 mmHg, peak 44.64 mmHg), moderate TR, RVSP 64.85 mmHg.    b. ALVINO 4/8/22: LVEF 25%, aortic annulus 2.7 cm, AV peak velocity 3.64 m/sec, AV mean gradient 32 mm Hg, moderate to severe MR  c. Dobutamine stress echo 4/27/22: Ejection fraction increase from 25/5 to 35%, mean gradient 40.5 mm Hg with maximal velocity 4.01 " m/sec.  Calculated RADHA 0.7 cm2.  Stroke volume increased from 56 ml to 75 ml  2. Combined systolic & diastolic heart failure  a. Echo 4/2021, [facility]: EF 35-40%, grade II diastolic dysfunction, moderate to severe MR, mild AI, moderate AS (mean PG 28.08 mmHg, peak 44.64 mmHg), moderate TR, RVSP 64.85 mmHg.   b. ALVINO 4/8/22: LVEF 25%, AV annulus 2.7 cm, AV mean 32 mm Hg, Vmax 3.64 m/sec  c. Dobutamine GXT 4/27/22: LVEF 25% @ baseline and increase to 35% @ dobutamine dose 20 mcg/kg/min  d. Current treatment: Entresto, Toprol xl, Januvia, demadex/kdur  3. CAD  A.  Cardiac cath 4/8/22: RCA 30% stenosis, Circumflex minimal irregularities, LAD D2 contains 60% proximal stenosis in a vessel approximately 2 mm diameter.  4. Hyperlipidemia  5. Hypothyroidism  6. GERD with hx PUD  A.  Remote GIB with transfusion age 30's  7. Type 2 Diabetes  8. Depression  9. HTN    Patient Active Problem List   Diagnosis   • Aortic valve disorder   • Pleural effusion due to CHF (congestive heart failure) (HCC)   • Cardiomyopathy (HCC)   • Aortic stenosis, severe   • Coronary artery disease involving coronary bypass graft of native heart without angina pectoris   • Chronic combined systolic and diastolic congestive heart failure (HCC)   • Primary hypertension   • Dyslipidemia   • Type 2 diabetes mellitus without complication, without long-term current use of insulin (HCC)       Past Surgical History:  Past Surgical History:   Procedure Laterality Date   • CARDIAC CATHETERIZATION Left 04/08/2022    Procedure: Cardiac Catheterization/Vascular Study;  Surgeon: Adwoa Reynoso MD;  Location: [facility] CATH INVASIVE LOCATION;  Service: Cardiology;  Laterality: Left;   • GALLBLADDER SURGERY  1971   • UMBILICAL HERNIA REPAIR  1970       Allergies:  Doxycycline and Levaquin [levofloxacin]    Social History:  Social History     Socioeconomic History   • Marital status:    • Number of children: 1   • Years of education: High school + some college  "  Tobacco Use   • Smoking status: Former Smoker     Packs/day: 0.25     Years: 0.50     Pack years: 0.12     Quit date: 1970     Years since quittin.3   • Smokeless tobacco: Never Used   Vaping Use   • Vaping Use: Never used   Substance and Sexual Activity   • Alcohol use: Not Currently   • Drug use: Never       Last Dental Exam: \"a few months ago.  I have one more tooth they want to pull\".  Advised patient to get this completed before TAVR (22)    Requires pre-op Dental Referral: yes, patient states he will contact his dentist later this week    Current Medications:  Medication Sig Start Date End Date Taking? Authorizing Provider   atorvastatin (LIPITOR) 40 MG tablet Take 1 tablet by mouth Daily. 4/15/22   Adwoa Reynoso MD   Januvia 100 MG tablet Daily. 22   Lynda Gaston MD   levothyroxine (SYNTHROID, LEVOTHROID) 75 MCG tablet Take 75 mcg by mouth Daily.    Lynda Gaston MD   metoprolol succinate XL (TOPROL-XL) 50 MG 24 hr tablet Daily. 3/2/22   Lynda Gaston MD   omeprazole (priLOSEC) 40 MG capsule Daily. 22   Lynda Gaston MD   potassium chloride 10 MEQ CR tablet Take 10 mEq by mouth Daily. 3/3/22   Lynda Gaston MD   sacubitril-valsartan (Entresto) 49-51 MG tablet Take 1 tablet by mouth 2 (Two) Times a Day. 22   Adwoa Reynoso MD   tamsulosin (FLOMAX) 0.4 MG capsule 24 hr capsule Daily. 3/12/22   Lynda Gaston MD   torsemide (DEMADEX) 10 MG tablet Daily. 3/2/22   Lynda Gaston MD       Review of Systems:  Review of Systems   Constitutional: Positive for malaise/fatigue and weight gain.   HENT: Negative.    Eyes: Positive for visual disturbance.        Recent inflammation left eye with lip edema.  Treated w/ steroids per PCP.  Improved   Cardiovascular: Positive for chest pain, dyspnea on exertion and leg swelling. Negative for claudication, cyanosis, irregular heartbeat, near-syncope, orthopnea, palpitations, " paroxysmal nocturnal dyspnea and syncope.        Sometimes has chest pressure when out of breath   Respiratory: Positive for shortness of breath. Negative for sleep disturbances due to breathing.    Endocrine: Negative.    Hematologic/Lymphatic: Does not bruise/bleed easily.   Skin: Negative.    Musculoskeletal: Positive for arthritis.   Gastrointestinal: Positive for bloating.   Genitourinary: Positive for nocturia.   Neurological: Positive for loss of balance and weakness.   Psychiatric/Behavioral: Negative.    Allergic/Immunologic: Negative.         Physical Exam:  Vitals reviewed.   Constitutional:       Appearance: Not in distress. Frail.   Eyes:      Conjunctiva/sclera: Conjunctivae normal.      Pupils: Pupils are equal, round, and reactive to light.   HENT:         Comments: No broken teeth or inflamed membranes.  States an extraction planned for right upper tooth  Neck:      Thyroid: No thyromegaly.      Lymphadenopathy: No cervical adenopathy.      Comments: Cardiac murmur radiates to left neck  Cardiovascular:      PMI at left midclavicular line. Normal rate. Regular rhythm.      Murmurs: There is a grade 3/6 mid frequency harsh, blowing midsystolic murmur at the URSB, radiating to the neck.   Pulses:     Intact distal pulses.   Edema:     Peripheral edema present.     Pretibial: bilateral 1+ edema of the pretibial area.     Ankle: bilateral 1+ edema of the ankle.     Feet: bilateral 1+ edema of the feet.  Musculoskeletal: Normal range of motion.         General: No deformity.      Extremities: No clubbing present.     Cervical back: Normal range of motion and neck supple.      Comments: Ambulates steadily w/o assistance Skin:     General: Skin is warm and dry.         Vitals:    04/27/22 1032 04/27/22 1356   BP: 145/71 125/95   Pulse: 68 72   Resp: 20 20   SpO2: 98%    Weight: 87.1 kg (192 lb)        Diagnostic Data:  Transthoracic echo: See problem list.  Collected @ [facility]  [facility]    Transesophageal echo: 4/8/22     · Estimated left ventricular EF = 25% Left ventricular systolic function is severely decreased.  · The left ventricular cavity is mildly dilated.  · Moderate to severe aortic valve stenosis is present. Severe aortic stenosis is suspected. Aortic annulus 2.7 cm.  · Peak velocity of the flow distal to the aortic valve is 364 cm/s. Aortic valve mean pressure gradient is 32 mmHg.  · Moderate to severe mitral valve regurgitation is present.        Cardiac Cath: 4/8/22   RCA: The right coronary is nondominant for the posterior circulation and gives rise to essentially one RV marginal branch.  The right coronary contains minimal irregularities all of which appear to be less than 30%.     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  The circumflex gives rise to a small first obtuse marginal branch small second obtuse marginal branch moderate third obtuse marginal branch a large fourth obtuse marginal branch the PDA and a posterolateral branch.  The circumflex contains minimal irregularities all of which appear to be less than 20 to 30%.     LMCA: The left main coronary artery gives rise to the LAD only.  There is no significant disease.      LAD: The LAD gives rise to a large multiply branching first diagonal moderate second diagonal several small diagonals and terminates as a small apical recurrent branch.  The second diagonal branch contains an estimated 60% proximal stenosis.  The vessel is barely 2 mm in diameter.      Dobutamine stress echo: 4/27/22  · The patient denied chest pain throughout the test. He reported dyspnea at baseline, which was unchanged throughout the test  · Baseline EKG with LBBB and PVCs. No significant ST segment shift during the infusion.  · Dobutamine administered and titrated per protocol. TVI: 24.48mL  · The baseline aortic velocity is 375 cm/s with a mean gradient of 38 mmHg. The ejection  fraction is estimated at 25%.  · The patient's heart rate increased from 62 bpm to only 70 bpm with the dobutamine infusion.  · The ejection fraction appeared to increase from 25% to 35%.  · The echocardiogram following dobutamine dosing showed a maximal velocity of 401 cm/s with a mean gradient of 40.5 mmHg. Calculated aortic valve area 0.7 cm².  · The stroke-volume increased from 56 to 75 mL. The aortic valve gradient remained unchanged.  · Based on the above criteria the patient qualifies for TAVR.      CTA Chest, Abdomen, and Pelvis: FINDINGS:  History indicates severe aortic stenosis. Unenhanced images show dense  and extensive aortic valve calcification and moderate coronary artery  calcification.     Angiographic images show maximal diameter of the ascending thoracic  aorta to be approximately 4.6 cm. Descending aorta measures up to 3.1  cm. No dissection is seen. There is moderate volume and calcific  atherosclerosis of the thoracoabdominal aorta and iliac vessels but no  evidence of significant aortoiliac stenosis or proximal superficial  femoral stenosis.      No thrombus is seen in the atrial appendages. Contrast opacification of  the pulmonary arteries is sufficient to exclude any large emboli. No  mediastinal mass or adenopathy is seen. No pericardial or pleural  effusion is identified. Lungs appear grossly clear.     There is diffuse fatty liver change. Gallbladder is within normal limits  in appearance. Spleen, adrenal glands, and kidneys appear within normal  limits. No upper abdominal adenopathy, ascites or acute inflammatory  focus is seen. Large and small bowel loops are normal in caliber and  normal in appearance. There is nonspecific pancreatic ductal dilatation  with additional areas of focal cystic change and a low-attenuation  finding present measuring 13 mm along the distal pancreatic body.     Regarding the lower abdomen and pelvis, the bladder is nondistended and  normal in appearance.  Terminal ileum and cecum appear normal. Bony  structures appear to be intact.     IMPRESSION:  1. CTA of the chest, abdomen and pelvis, with image data saved per TAVR  protocol.  2. Dense calcification of the aortic valve leaflets. 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 No evidence of significant  aortoiliac luminal stenosis.  3. Pancreatic ductal dilatation with multiple tiny adjacent cystic  findings in addition to hypoattenuating adjacent 13 mm finding. These  findings are nonspecific and potentially benign, however in the absence  of prior comparison, consider nonemergent pancreatic protocol MRI to  further evaluate.  4. No other evidence of acute disease in the chest, abdomen or pelvis.  This report was finalized on 4/27/2022 10:43 PM by Royal Ward.    Carotid Doppler: 4/27/22  · Right internal carotid artery stenosis of 0-49%.  · Left internal carotid artery stenosis of 0-49%.  · Bilateral heterogeneous carotid atherosclerosis greater on the left than the right.            Functional Assessment Data:    KCCQ12 Questionnaire Score: (see scanned copy) : 22/70 =  NYHA class IV-IV      Antonio Basic Activities of Daily Living (ADL) Scale    Bathing (sponge bath, tub bath, or shower).  Receives either no assistance,   or assistance with bathing only on body part.   Yes    Dressing Gets clothes and dresses without any assistance, except for  tying shoes.        Yes    Toileting Goes to toilet room, uses toilet, arranges clothes, and returns  without any assistance (may use cane or walker for support and may use  bedpan / urinal at night.      Yes    Transferring Moves into and out of bed and chair without assistance  (may use cane or walker)      Yes    Continence Controls bowel and bladder completely without occasional  accidents        Yes    Feeding Feeds self without assistance (except for help with cutting meat  or buttering  bread)       Yes        Total (Number of Yesses of 6) 6/6      [place] Instrumental Activities of Daily Living Scale (IADL)    Ability to Use Telephone   · Operates telephone on own initiative.  Looks up and dials numbers, etc.  ·        1    Shopping  · Takes care of all shopping needs indepently  1    Food Preparation  · Plans, prepares, and serves adequate meals independently  ·         1    Housekeeping  · Performs light daily tasks such as dish washing, bed making          1  Laundry  · Does personal laundry completely   1    Mode of Transportation  · Travels independently on public transportation or drives own car          1  Responsibility for Own Medications  · Is responsible for taking medications in correct dosages at correct time  ·         1    Ability to Handle Finances  · Manages financial matters independently (budgets, writes checks,   pays rent, bills, goes to bank), collects and keeps track of income  ·         1       Total (Number of Instrumental Activities of 8) 8/8         Five Meter Walk Test    Utilized Walking Aid? No     Walk 1: 5.7 s/5m     Walk 2: 7.2 s/5m     Walk 3: 5.03 s/5m    Five Meter Walk Average: 5.98 s/5m    Gait Speed: Normal (Average < or = 6 s/5m)    STS risk of mortality with open AVR    http://[email or web address].sts.org/stswebriskcalc/calculate       Creatinine Clearance: 45 ml/min  [email or web address]://reference.[employer].Zeo/calculator/creatinine-clearance-cockcroft-gault    Assessment/ Plan:     ICD-10-CM ICD-9-CM   1. Aortic stenosis, severe.  Symptomatic frail, elderly gentleman.  He has met with Dr. Green and  as well as completing his pre-op testing today.  We discussed risk/benefits of the TAVR procedure as well as expected recovery and follow up. I35.0 424.1   2. Combined systolic/diastolic heart failure.  NYHA class III-IV.  Good response to dobutamine echo earlier today per Dr. Reynoso.  Continue medical therapy with Entresto, Metoprolol xl, and diruetics     "  3. Type 2 DM     4. CAD, non-obstructive     5. HTN         TAVR education materials reviewed with patient/ brother in law today.  This included printed brochure detailing pathophysiology of aortic stenosis, patient specific aortic stenosis symptoms, and treatment options (medical therapy, surgical aortic valve replacement, and transcatheter aortic valve replacement).  A model of the valve and procedural animation were also used to explain TAVR.      We discussed patient's goals of care: \"I would like to be able to keep going\".  We reviewed the [facility] Shared Decision Making Tool for treatment of Aortic Stenosis to again compare/contrast the options of TAVR/ SAVR/ medical management.  Mr. Webster does not wish to pursue SAVR or medical management only.    Patient has Living Will (N) and Power of  (N).  After 20 min APC discussion, Mr. Webster is agreeable to take an APC workbook home to hank, sign, and return for witness signature.      Our talk also included procedural details, procedure risks, anticipated pre-op and post-op expectations, as well as follow up visit schedule @ one month and one year.  Further discussion and final decision making will occur with Multi- Disciplinary Heart Team following the completion and review of pre-requisite testing.   Tentative TAVR procedure date 5/16/22 or 5/26/22    URVASHI Ross, 05/03/22, 12:20 EDT  "

## 2022-04-28 LAB
BH CV XLRA MEAS LEFT CAROTID BULB EDV: 9.43 CM/SEC
BH CV XLRA MEAS LEFT CAROTID BULB PSV: 51.5 CM/SEC
BH CV XLRA MEAS LEFT DIST CCA EDV: 13.2 CM/SEC
BH CV XLRA MEAS LEFT DIST CCA PSV: 49.7 CM/SEC
BH CV XLRA MEAS LEFT DIST ICA EDV: 20.1 CM/SEC
BH CV XLRA MEAS LEFT DIST ICA PSV: 102 CM/SEC
BH CV XLRA MEAS LEFT ICA/CCA RATIO: 1.71
BH CV XLRA MEAS LEFT MID CCA EDV: 15.1 CM/SEC
BH CV XLRA MEAS LEFT MID CCA PSV: 55.9 CM/SEC
BH CV XLRA MEAS LEFT MID ICA EDV: 27.7 CM/SEC
BH CV XLRA MEAS LEFT MID ICA PSV: 107 CM/SEC
BH CV XLRA MEAS LEFT PROX CCA EDV: 10.1 CM/SEC
BH CV XLRA MEAS LEFT PROX CCA PSV: 70.4 CM/SEC
BH CV XLRA MEAS LEFT PROX ECA PSV: 101 CM/SEC
BH CV XLRA MEAS LEFT PROX ICA EDV: 24.8 CM/SEC
BH CV XLRA MEAS LEFT PROX ICA PSV: 79.4 CM/SEC
BH CV XLRA MEAS LEFT PROX SCLA PSV: 190 CM/SEC
BH CV XLRA MEAS LEFT VERTEBRAL A PSV: 63.5 CM/SEC
BH CV XLRA MEAS RIGHT CAROTID BULB EDV: 11 CM/SEC
BH CV XLRA MEAS RIGHT CAROTID BULB PSV: 49 CM/SEC
BH CV XLRA MEAS RIGHT DIST CCA EDV: 8.49 CM/SEC
BH CV XLRA MEAS RIGHT DIST CCA PSV: 10.5 CM/SEC
BH CV XLRA MEAS RIGHT DIST ICA EDV: 20.4 CM/SEC
BH CV XLRA MEAS RIGHT DIST ICA PSV: 66.2 CM/SEC
BH CV XLRA MEAS RIGHT ICA/CCA RATIO: 1.11
BH CV XLRA MEAS RIGHT MID CCA EDV: 14.3 CM/SEC
BH CV XLRA MEAS RIGHT MID CCA PSV: 57.9 CM/SEC
BH CV XLRA MEAS RIGHT MID ICA EDV: 21.5 CM/SEC
BH CV XLRA MEAS RIGHT MID ICA PSV: 64 CM/SEC
BH CV XLRA MEAS RIGHT PROX CCA EDV: 15.7 CM/SEC
BH CV XLRA MEAS RIGHT PROX CCA PSV: 129 CM/SEC
BH CV XLRA MEAS RIGHT PROX ECA PSV: 68.4 CM/SEC
BH CV XLRA MEAS RIGHT PROX ICA EDV: 20.4 CM/SEC
BH CV XLRA MEAS RIGHT PROX ICA PSV: 64.4 CM/SEC
BH CV XLRA MEAS RIGHT PROX SCLA PSV: 158 CM/SEC
BH CV XLRA MEAS RIGHT VERTEBRAL A PSV: 29.7 CM/SEC
LEFT ARM BP: NORMAL MMHG
MAXIMAL PREDICTED HEART RATE: 138 BPM
RIGHT ARM BP: NORMAL MMHG
STRESS TARGET HR: 117 BPM

## 2022-05-03 PROBLEM — I10 PRIMARY HYPERTENSION: Status: ACTIVE | Noted: 2022-04-08

## 2022-05-03 PROBLEM — I50.42 CHRONIC COMBINED SYSTOLIC AND DIASTOLIC CONGESTIVE HEART FAILURE: Status: ACTIVE | Noted: 2022-04-08

## 2022-05-03 PROBLEM — E11.9 TYPE 2 DIABETES MELLITUS WITHOUT COMPLICATION, WITHOUT LONG-TERM CURRENT USE OF INSULIN: Status: ACTIVE | Noted: 2022-04-08

## 2022-05-03 PROBLEM — I25.810 CORONARY ARTERY DISEASE INVOLVING CORONARY BYPASS GRAFT OF NATIVE HEART WITHOUT ANGINA PECTORIS: Status: ACTIVE | Noted: 2022-04-08

## 2022-05-03 PROBLEM — E78.5 DYSLIPIDEMIA: Status: ACTIVE | Noted: 2022-04-08

## 2022-05-04 ENCOUNTER — TELEPHONE (OUTPATIENT)
Dept: CARDIOLOGY | Facility: HOSPITAL | Age: 82
End: 2022-05-04

## 2022-05-04 DIAGNOSIS — K86.89 DILATION OF PANCREATIC DUCT: Primary | ICD-10-CM

## 2022-05-04 NOTE — TELEPHONE ENCOUNTER
RICHARD LANDIN    Notified patient of abnormal findings in CTA after sharing results with Dr. Green and [place].      Radiologist recommended pancreatic protocol MRI.  Mr. Webster is agreeable to schedule.  See order below.    Hortensia LANDIN

## 2022-05-05 DIAGNOSIS — I35.0 AORTIC STENOSIS, SEVERE: Primary | ICD-10-CM

## 2022-05-06 ENCOUNTER — PREP FOR SURGERY (OUTPATIENT)
Dept: OTHER | Facility: HOSPITAL | Age: 82
End: 2022-05-06

## 2022-05-06 DIAGNOSIS — I35.9 AVD (AORTIC VALVE DISEASE): Primary | ICD-10-CM

## 2022-05-09 RX ORDER — CHLORHEXIDINE GLUCONATE 500 MG/1
1 CLOTH TOPICAL EVERY 12 HOURS PRN
Status: CANCELLED | OUTPATIENT
Start: 2022-05-13

## 2022-05-09 RX ORDER — NITROGLYCERIN 0.4 MG/1
0.4 TABLET SUBLINGUAL
Status: CANCELLED | OUTPATIENT
Start: 2022-05-16

## 2022-05-09 RX ORDER — CHLORHEXIDINE GLUCONATE 0.12 MG/ML
15 RINSE ORAL ONCE
Status: CANCELLED | OUTPATIENT
Start: 2022-05-16 | End: 2022-05-16

## 2022-05-09 RX ORDER — ASPIRIN 325 MG
325 TABLET ORAL NIGHTLY
Status: CANCELLED | OUTPATIENT
Start: 2022-05-13 | End: 2022-05-14

## 2022-05-09 RX ORDER — CHLORHEXIDINE GLUCONATE 500 MG/1
1 CLOTH TOPICAL EVERY 12 HOURS PRN
Status: CANCELLED | OUTPATIENT
Start: 2022-05-16

## 2022-05-10 LAB — CREAT BLDA-MCNC: 1.5 MG/DL (ref 0.6–1.3)

## 2022-05-13 ENCOUNTER — HOSPITAL ENCOUNTER (OUTPATIENT)
Dept: PULMONOLOGY | Facility: HOSPITAL | Age: 82
Discharge: HOME | End: 2022-05-13

## 2022-05-13 ENCOUNTER — HOSPITAL ENCOUNTER (OUTPATIENT)
Dept: GENERAL RADIOLOGY | Facility: HOSPITAL | Age: 82
Discharge: HOME | End: 2022-05-13

## 2022-05-13 ENCOUNTER — PRE-ADMISSION TESTING (OUTPATIENT)
Dept: PREADMISSION TESTING | Facility: HOSPITAL | Age: 82
End: 2022-05-13

## 2022-05-13 VITALS — OXYGEN SATURATION: 97 % | HEIGHT: 70 IN | BODY MASS INDEX: 26.62 KG/M2 | WEIGHT: 185.96 LBS

## 2022-05-13 DIAGNOSIS — I35.9 AVD (AORTIC VALVE DISEASE): ICD-10-CM

## 2022-05-13 DIAGNOSIS — R79.9 ABNORMAL FINDING OF BLOOD CHEMISTRY, UNSPECIFIED: ICD-10-CM

## 2022-05-13 DIAGNOSIS — I35.0 AORTIC STENOSIS, SEVERE: ICD-10-CM

## 2022-05-13 LAB
ABO GROUP BLD: NORMAL
ALBUMIN SERPL-MCNC: 3.9 G/DL (ref 3.5–5.2)
ALBUMIN/GLOB SERPL: 1.9 G/DL
ALP SERPL-CCNC: 42 U/L (ref 39–117)
ALT SERPL W P-5'-P-CCNC: 9 U/L (ref 1–41)
AMPHET+METHAMPHET UR QL: NEGATIVE
AMPHETAMINES UR QL: NEGATIVE
ANION GAP SERPL CALCULATED.3IONS-SCNC: 9 MMOL/L (ref 5–15)
APTT PPP: 32.5 SECONDS (ref 22–39)
AST SERPL-CCNC: 9 U/L (ref 1–40)
BARBITURATES UR QL SCN: NEGATIVE
BASOPHILS # BLD AUTO: 0.03 10*3/MM3 (ref 0–0.2)
BASOPHILS NFR BLD AUTO: 0.3 % (ref 0–1.5)
BENZODIAZ UR QL SCN: NEGATIVE
BILIRUB SERPL-MCNC: 0.8 MG/DL (ref 0–1.2)
BLD GP AB SCN SERPL QL: NEGATIVE
BUN SERPL-MCNC: 21 MG/DL (ref 8–23)
BUN/CREAT SERPL: 15.6 (ref 7–25)
BUPRENORPHINE SERPL-MCNC: NEGATIVE NG/ML
CALCIUM SPEC-SCNC: 9.3 MG/DL (ref 8.6–10.5)
CANNABINOIDS SERPL QL: NEGATIVE
CHLORIDE SERPL-SCNC: 108 MMOL/L (ref 98–107)
CO2 SERPL-SCNC: 26 MMOL/L (ref 22–29)
COCAINE UR QL: NEGATIVE
CREAT SERPL-MCNC: 1.35 MG/DL (ref 0.76–1.27)
DEPRECATED RDW RBC AUTO: 46.5 FL (ref 37–54)
EGFRCR SERPLBLD CKD-EPI 2021: 52.4 ML/MIN/1.73
EOSINOPHIL # BLD AUTO: 0.1 10*3/MM3 (ref 0–0.4)
EOSINOPHIL NFR BLD AUTO: 1 % (ref 0.3–6.2)
ERYTHROCYTE [DISTWIDTH] IN BLOOD BY AUTOMATED COUNT: 13.6 % (ref 12.3–15.4)
FLUAV RNA RESP QL NAA+PROBE: NOT DETECTED
FLUBV RNA RESP QL NAA+PROBE: NOT DETECTED
GLOBULIN UR ELPH-MCNC: 2.1 GM/DL
GLUCOSE SERPL-MCNC: 149 MG/DL (ref 65–99)
HBA1C MFR BLD: 7.3 % (ref 4.8–5.6)
HCT VFR BLD AUTO: 41.3 % (ref 37.5–51)
HGB BLD-MCNC: 13.7 G/DL (ref 13–17.7)
IMM GRANULOCYTES # BLD AUTO: 0.03 10*3/MM3 (ref 0–0.05)
IMM GRANULOCYTES NFR BLD AUTO: 0.3 % (ref 0–0.5)
INR PPP: 1.09 (ref 0.84–1.13)
LYMPHOCYTES # BLD AUTO: 0.92 10*3/MM3 (ref 0.7–3.1)
LYMPHOCYTES NFR BLD AUTO: 9.4 % (ref 19.6–45.3)
MAGNESIUM SERPL-MCNC: 1.9 MG/DL (ref 1.6–2.4)
MCH RBC QN AUTO: 30.8 PG (ref 26.6–33)
MCHC RBC AUTO-ENTMCNC: 33.2 G/DL (ref 31.5–35.7)
MCV RBC AUTO: 92.8 FL (ref 79–97)
METHADONE UR QL SCN: NEGATIVE
MONOCYTES # BLD AUTO: 0.51 10*3/MM3 (ref 0.1–0.9)
MONOCYTES NFR BLD AUTO: 5.2 % (ref 5–12)
NEUTROPHILS NFR BLD AUTO: 8.17 10*3/MM3 (ref 1.7–7)
NEUTROPHILS NFR BLD AUTO: 83.8 % (ref 42.7–76)
NRBC BLD AUTO-RTO: 0 /100 WBC (ref 0–0.2)
OPIATES UR QL: NEGATIVE
OXYCODONE UR QL SCN: NEGATIVE
PA ADP PRP-ACNC: 236 PRU
PCP UR QL SCN: NEGATIVE
PLATELET # BLD AUTO: 193 10*3/MM3 (ref 140–450)
PMV BLD AUTO: 11.1 FL (ref 6–12)
POTASSIUM SERPL-SCNC: 4 MMOL/L (ref 3.5–5.2)
PROPOXYPH UR QL: NEGATIVE
PROT SERPL-MCNC: 6 G/DL (ref 6–8.5)
PROTHROMBIN TIME: 14 SECONDS (ref 11.4–14.4)
RBC # BLD AUTO: 4.45 10*6/MM3 (ref 4.14–5.8)
RH BLD: POSITIVE
SARS-COV-2 RNA RESP QL NAA+PROBE: NOT DETECTED
SODIUM SERPL-SCNC: 143 MMOL/L (ref 136–145)
T&S EXPIRATION DATE: NORMAL
TRICYCLICS UR QL SCN: NEGATIVE
WBC NRBC COR # BLD: 9.76 10*3/MM3 (ref 3.4–10.8)

## 2022-05-13 PROCEDURE — 94010 BREATHING CAPACITY TEST: CPT

## 2022-05-13 PROCEDURE — 80053 COMPREHEN METABOLIC PANEL: CPT

## 2022-05-13 PROCEDURE — 86900 BLOOD TYPING SEROLOGIC ABO: CPT

## 2022-05-13 PROCEDURE — 87636 SARSCOV2 & INF A&B AMP PRB: CPT

## 2022-05-13 PROCEDURE — 80306 DRUG TEST PRSMV INSTRMNT: CPT

## 2022-05-13 PROCEDURE — 83036 HEMOGLOBIN GLYCOSYLATED A1C: CPT

## 2022-05-13 PROCEDURE — 86901 BLOOD TYPING SEROLOGIC RH(D): CPT

## 2022-05-13 PROCEDURE — 85730 THROMBOPLASTIN TIME PARTIAL: CPT

## 2022-05-13 PROCEDURE — 86850 RBC ANTIBODY SCREEN: CPT

## 2022-05-13 PROCEDURE — 36415 COLL VENOUS BLD VENIPUNCTURE: CPT

## 2022-05-13 PROCEDURE — 85576 BLOOD PLATELET AGGREGATION: CPT

## 2022-05-13 PROCEDURE — C9803 HOPD COVID-19 SPEC COLLECT: HCPCS

## 2022-05-13 PROCEDURE — 71046 X-RAY EXAM CHEST 2 VIEWS: CPT

## 2022-05-13 PROCEDURE — 86920 COMPATIBILITY TEST SPIN: CPT

## 2022-05-13 PROCEDURE — 85610 PROTHROMBIN TIME: CPT

## 2022-05-13 PROCEDURE — 94010 BREATHING CAPACITY TEST: CPT | Performed by: INTERNAL MEDICINE

## 2022-05-13 PROCEDURE — 83735 ASSAY OF MAGNESIUM: CPT

## 2022-05-13 PROCEDURE — 85025 COMPLETE CBC W/AUTO DIFF WBC: CPT

## 2022-05-13 RX ORDER — DOCUSATE SODIUM 100 MG/1
100 CAPSULE, LIQUID FILLED ORAL 2 TIMES DAILY PRN
COMMUNITY

## 2022-05-13 RX ORDER — ASPIRIN 325 MG
325 TABLET ORAL NIGHTLY
Status: SHIPPED | OUTPATIENT
Start: 2022-05-13 | End: 2022-05-14

## 2022-05-13 RX ORDER — CHLORHEXIDINE GLUCONATE 500 MG/1
1 CLOTH TOPICAL EVERY 12 HOURS PRN
Status: DISCONTINUED | OUTPATIENT
Start: 2022-05-13 | End: 2022-05-23

## 2022-05-13 RX ORDER — FINASTERIDE 5 MG/1
5 TABLET, FILM COATED ORAL DAILY
COMMUNITY
End: 2023-02-08 | Stop reason: ALTCHOICE

## 2022-05-15 ENCOUNTER — ANESTHESIA EVENT (OUTPATIENT)
Dept: PERIOP | Facility: HOSPITAL | Age: 82
End: 2022-05-15

## 2022-05-15 RX ORDER — FAMOTIDINE 10 MG/ML
20 INJECTION, SOLUTION INTRAVENOUS ONCE
Status: CANCELLED | OUTPATIENT
Start: 2022-05-15 | End: 2022-05-15

## 2022-05-15 RX ORDER — SODIUM CHLORIDE, SODIUM LACTATE, POTASSIUM CHLORIDE, CALCIUM CHLORIDE 600; 310; 30; 20 MG/100ML; MG/100ML; MG/100ML; MG/100ML
9 INJECTION, SOLUTION INTRAVENOUS CONTINUOUS
Status: CANCELLED | OUTPATIENT
Start: 2022-05-15

## 2022-05-16 ENCOUNTER — ANCILLARY PROCEDURE (OUTPATIENT)
Dept: PERIOP | Facility: HOSPITAL | Age: 82
End: 2022-05-16

## 2022-05-16 ENCOUNTER — ANESTHESIA (OUTPATIENT)
Dept: PERIOP | Facility: HOSPITAL | Age: 82
End: 2022-05-16

## 2022-05-16 ENCOUNTER — HOSPITAL ENCOUNTER (INPATIENT)
Facility: HOSPITAL | Age: 82
LOS: 1 days | Discharge: HOME | End: 2022-05-17
Attending: THORACIC SURGERY (CARDIOTHORACIC VASCULAR SURGERY) | Admitting: THORACIC SURGERY (CARDIOTHORACIC VASCULAR SURGERY)

## 2022-05-16 DIAGNOSIS — I35.9 AVD (AORTIC VALVE DISEASE): ICD-10-CM

## 2022-05-16 DIAGNOSIS — I35.0 AORTIC STENOSIS, SEVERE: ICD-10-CM

## 2022-05-16 DIAGNOSIS — I25.810 CORONARY ARTERY DISEASE INVOLVING CORONARY BYPASS GRAFT OF NATIVE HEART WITHOUT ANGINA PECTORIS: ICD-10-CM

## 2022-05-16 DIAGNOSIS — Z95.3 S/P TAVR (TRANSCATHETER AORTIC VALVE REPLACEMENT), BIOPROSTHETIC: Primary | ICD-10-CM

## 2022-05-16 DIAGNOSIS — I50.42 CHRONIC COMBINED SYSTOLIC AND DIASTOLIC CONGESTIVE HEART FAILURE: ICD-10-CM

## 2022-05-16 PROBLEM — N18.31 STAGE 3A CHRONIC KIDNEY DISEASE: Status: ACTIVE | Noted: 2022-05-16

## 2022-05-16 PROBLEM — I08.0 MITRAL REGURGITATION AND AORTIC STENOSIS: Status: ACTIVE | Noted: 2022-05-16

## 2022-05-16 LAB
ABO GROUP BLD: NORMAL
ACT BLD: 154 SECONDS (ref 82–152)
ACT BLD: 309 SECONDS (ref 82–152)
ANION GAP SERPL CALCULATED.3IONS-SCNC: 9 MMOL/L (ref 5–15)
APTT PPP: 36.7 SECONDS (ref 22–39)
ASCENDING AORTA: 4.8 CM
BASE EXCESS BLDA CALC-SCNC: -4 MMOL/L (ref -5–5)
BUN SERPL-MCNC: 19 MG/DL (ref 8–23)
BUN/CREAT SERPL: 17.9 (ref 7–25)
CA-I BLDA-SCNC: 1.19 MMOL/L (ref 1.2–1.32)
CALCIUM SPEC-SCNC: 8.2 MG/DL (ref 8.6–10.5)
CHLORIDE SERPL-SCNC: 110 MMOL/L (ref 98–107)
CO2 BLDA-SCNC: 22 MMOL/L (ref 24–29)
CO2 SERPL-SCNC: 22 MMOL/L (ref 22–29)
CREAT SERPL-MCNC: 1.06 MG/DL (ref 0.76–1.27)
DEPRECATED RDW RBC AUTO: 45.4 FL (ref 37–54)
EGFRCR SERPLBLD CKD-EPI 2021: 70.1 ML/MIN/1.73
ERYTHROCYTE [DISTWIDTH] IN BLOOD BY AUTOMATED COUNT: 13.5 % (ref 12.3–15.4)
GLUCOSE BLDC GLUCOMTR-MCNC: 129 MG/DL (ref 70–130)
GLUCOSE BLDC GLUCOMTR-MCNC: 131 MG/DL (ref 70–130)
GLUCOSE BLDC GLUCOMTR-MCNC: 141 MG/DL (ref 70–130)
GLUCOSE SERPL-MCNC: 159 MG/DL (ref 65–99)
HCO3 BLDA-SCNC: 21.1 MMOL/L (ref 22–26)
HCT VFR BLD AUTO: 34.9 % (ref 37.5–51)
HCT VFR BLDA CALC: 30 % (ref 38–51)
HGB BLD-MCNC: 11.7 G/DL (ref 13–17.7)
HGB BLDA-MCNC: 10.2 G/DL (ref 12–17)
INR PPP: 1.22 (ref 0.84–1.13)
LV EF 2D ECHO EST: 25 %
MAGNESIUM SERPL-MCNC: 2.3 MG/DL (ref 1.6–2.4)
MCH RBC QN AUTO: 31 PG (ref 26.6–33)
MCHC RBC AUTO-ENTMCNC: 33.5 G/DL (ref 31.5–35.7)
MCV RBC AUTO: 92.3 FL (ref 79–97)
PCO2 BLDA: 33.5 MM HG (ref 35–45)
PH BLDA: 7.41 PH UNITS (ref 7.35–7.6)
PLATELET # BLD AUTO: 168 10*3/MM3 (ref 140–450)
PMV BLD AUTO: 11.5 FL (ref 6–12)
PO2 BLDA: 155 MMHG (ref 80–105)
POTASSIUM BLDA-SCNC: 3.8 MMOL/L (ref 3.5–4.9)
POTASSIUM SERPL-SCNC: 4.2 MMOL/L (ref 3.5–5.2)
PROTHROMBIN TIME: 15.3 SECONDS (ref 11.4–14.4)
QT INTERVAL: 510 MS
QTC INTERVAL: 538 MS
RBC # BLD AUTO: 3.78 10*6/MM3 (ref 4.14–5.8)
RH BLD: POSITIVE
SAO2 % BLDA: 99 % (ref 95–98)
SODIUM BLD-SCNC: 142 MMOL/L (ref 138–146)
SODIUM SERPL-SCNC: 141 MMOL/L (ref 136–145)
UFH PPP CHRO-ACNC: 0.1 IU/ML (ref 0.3–0.7)
WBC NRBC COR # BLD: 10.25 10*3/MM3 (ref 3.4–10.8)

## 2022-05-16 PROCEDURE — 82330 ASSAY OF CALCIUM: CPT

## 2022-05-16 PROCEDURE — C1894 INTRO/SHEATH, NON-LASER: HCPCS | Performed by: THORACIC SURGERY (CARDIOTHORACIC VASCULAR SURGERY)

## 2022-05-16 PROCEDURE — C1769 GUIDE WIRE: HCPCS | Performed by: THORACIC SURGERY (CARDIOTHORACIC VASCULAR SURGERY)

## 2022-05-16 PROCEDURE — 25010000002 HEPARIN (PORCINE) PER 1000 UNITS: Performed by: THORACIC SURGERY (CARDIOTHORACIC VASCULAR SURGERY)

## 2022-05-16 PROCEDURE — 86901 BLOOD TYPING SEROLOGIC RH(D): CPT

## 2022-05-16 PROCEDURE — 99233 SBSQ HOSP IP/OBS HIGH 50: CPT | Performed by: INTERNAL MEDICINE

## 2022-05-16 PROCEDURE — 84132 ASSAY OF SERUM POTASSIUM: CPT

## 2022-05-16 PROCEDURE — 82803 BLOOD GASES ANY COMBINATION: CPT

## 2022-05-16 PROCEDURE — 25010000002 EPINEPHRINE 1 MG/10ML SOLUTION PREFILLED SYRINGE: Performed by: NURSE ANESTHETIST, CERTIFIED REGISTERED

## 2022-05-16 PROCEDURE — 93010 ELECTROCARDIOGRAM REPORT: CPT | Performed by: INTERNAL MEDICINE

## 2022-05-16 PROCEDURE — 85520 HEPARIN ASSAY: CPT

## 2022-05-16 PROCEDURE — 85347 COAGULATION TIME ACTIVATED: CPT

## 2022-05-16 PROCEDURE — 86900 BLOOD TYPING SEROLOGIC ABO: CPT

## 2022-05-16 PROCEDURE — 84295 ASSAY OF SERUM SODIUM: CPT

## 2022-05-16 PROCEDURE — 85027 COMPLETE CBC AUTOMATED: CPT | Performed by: STUDENT IN AN ORGANIZED HEALTH CARE EDUCATION/TRAINING PROGRAM

## 2022-05-16 PROCEDURE — B41D1ZZ FLUOROSCOPY OF AORTA AND BILATERAL LOWER EXTREMITY ARTERIES USING LOW OSMOLAR CONTRAST: ICD-10-PCS | Performed by: INTERNAL MEDICINE

## 2022-05-16 PROCEDURE — 85014 HEMATOCRIT: CPT

## 2022-05-16 PROCEDURE — 25010000002 PHENYLEPHRINE 10 MG/ML SOLUTION 1 ML VIAL: Performed by: NURSE ANESTHETIST, CERTIFIED REGISTERED

## 2022-05-16 PROCEDURE — 82947 ASSAY GLUCOSE BLOOD QUANT: CPT

## 2022-05-16 PROCEDURE — 5A1223Z PERFORMANCE OF CARDIAC PACING, CONTINUOUS: ICD-10-PCS | Performed by: INTERNAL MEDICINE

## 2022-05-16 PROCEDURE — 33361 REPLACE AORTIC VALVE PERQ: CPT | Performed by: THORACIC SURGERY (CARDIOTHORACIC VASCULAR SURGERY)

## 2022-05-16 PROCEDURE — 85730 THROMBOPLASTIN TIME PARTIAL: CPT | Performed by: STUDENT IN AN ORGANIZED HEALTH CARE EDUCATION/TRAINING PROGRAM

## 2022-05-16 PROCEDURE — 25010000002 PROTAMINE SULFATE PER 10 MG: Performed by: NURSE ANESTHETIST, CERTIFIED REGISTERED

## 2022-05-16 PROCEDURE — 25010000002 CEFAZOLIN IN DEXTROSE 2-4 GM/100ML-% SOLUTION: Performed by: PHYSICIAN ASSISTANT

## 2022-05-16 PROCEDURE — C1889 IMPLANT/INSERT DEVICE, NOC: HCPCS | Performed by: THORACIC SURGERY (CARDIOTHORACIC VASCULAR SURGERY)

## 2022-05-16 PROCEDURE — 25010000002 ONDANSETRON PER 1 MG: Performed by: NURSE ANESTHETIST, CERTIFIED REGISTERED

## 2022-05-16 PROCEDURE — 93355 ECHO TRANSESOPHAGEAL (TEE): CPT

## 2022-05-16 PROCEDURE — 33361 REPLACE AORTIC VALVE PERQ: CPT | Performed by: INTERNAL MEDICINE

## 2022-05-16 PROCEDURE — 25010000002 DEXAMETHASONE PER 1 MG: Performed by: NURSE ANESTHETIST, CERTIFIED REGISTERED

## 2022-05-16 PROCEDURE — 85610 PROTHROMBIN TIME: CPT | Performed by: THORACIC SURGERY (CARDIOTHORACIC VASCULAR SURGERY)

## 2022-05-16 PROCEDURE — 93005 ELECTROCARDIOGRAM TRACING: CPT | Performed by: INTERNAL MEDICINE

## 2022-05-16 PROCEDURE — 25010000002 FENTANYL CITRATE (PF) 50 MCG/ML SOLUTION: Performed by: NURSE ANESTHETIST, CERTIFIED REGISTERED

## 2022-05-16 PROCEDURE — 25010000002 HEPARIN (PORCINE) PER 1000 UNITS: Performed by: NURSE ANESTHETIST, CERTIFIED REGISTERED

## 2022-05-16 PROCEDURE — 02RF38Z REPLACEMENT OF AORTIC VALVE WITH ZOOPLASTIC TISSUE, PERCUTANEOUS APPROACH: ICD-10-PCS | Performed by: THORACIC SURGERY (CARDIOTHORACIC VASCULAR SURGERY)

## 2022-05-16 PROCEDURE — C1887 CATHETER, GUIDING: HCPCS | Performed by: THORACIC SURGERY (CARDIOTHORACIC VASCULAR SURGERY)

## 2022-05-16 PROCEDURE — 93005 ELECTROCARDIOGRAM TRACING: CPT | Performed by: STUDENT IN AN ORGANIZED HEALTH CARE EDUCATION/TRAINING PROGRAM

## 2022-05-16 PROCEDURE — 83735 ASSAY OF MAGNESIUM: CPT | Performed by: THORACIC SURGERY (CARDIOTHORACIC VASCULAR SURGERY)

## 2022-05-16 PROCEDURE — 25010000002 EPINEPHRINE PER 0.1 MG: Performed by: NURSE ANESTHETIST, CERTIFIED REGISTERED

## 2022-05-16 PROCEDURE — 80048 BASIC METABOLIC PNL TOTAL CA: CPT | Performed by: STUDENT IN AN ORGANIZED HEALTH CARE EDUCATION/TRAINING PROGRAM

## 2022-05-16 PROCEDURE — 25010000002 HEPARIN (PORCINE) PER 1000 UNITS

## 2022-05-16 DEVICE — VLV HEART TRNSCATH SAPIEN3 29MM: Type: IMPLANTABLE DEVICE | Site: AORTA | Status: FUNCTIONAL

## 2022-05-16 RX ORDER — CEFAZOLIN SODIUM 2 G/100ML
2 INJECTION, SOLUTION INTRAVENOUS ONCE
Status: COMPLETED | OUTPATIENT
Start: 2022-05-16 | End: 2022-05-16

## 2022-05-16 RX ORDER — PANTOPRAZOLE SODIUM 40 MG/1
40 TABLET, DELAYED RELEASE ORAL
Status: DISCONTINUED | OUTPATIENT
Start: 2022-05-17 | End: 2022-05-17 | Stop reason: HOSPADM

## 2022-05-16 RX ORDER — SODIUM CHLORIDE 9 MG/ML
100 INJECTION, SOLUTION INTRAVENOUS CONTINUOUS
Status: ACTIVE | OUTPATIENT
Start: 2022-05-16 | End: 2022-05-16

## 2022-05-16 RX ORDER — LIDOCAINE HYDROCHLORIDE 10 MG/ML
0.5 INJECTION, SOLUTION EPIDURAL; INFILTRATION; INTRACAUDAL; PERINEURAL ONCE AS NEEDED
Status: COMPLETED | OUTPATIENT
Start: 2022-05-16 | End: 2022-05-16

## 2022-05-16 RX ORDER — FENTANYL CITRATE 50 UG/ML
INJECTION, SOLUTION INTRAMUSCULAR; INTRAVENOUS AS NEEDED
Status: DISCONTINUED | OUTPATIENT
Start: 2022-05-16 | End: 2022-05-16 | Stop reason: SURG

## 2022-05-16 RX ORDER — SODIUM CHLORIDE 0.9 % (FLUSH) 0.9 %
10 SYRINGE (ML) INJECTION AS NEEDED
Status: DISCONTINUED | OUTPATIENT
Start: 2022-05-16 | End: 2022-05-16 | Stop reason: HOSPADM

## 2022-05-16 RX ORDER — ASPIRIN 81 MG/1
81 TABLET ORAL DAILY
Status: DISCONTINUED | OUTPATIENT
Start: 2022-05-17 | End: 2022-05-17 | Stop reason: HOSPADM

## 2022-05-16 RX ORDER — ONDANSETRON 4 MG/1
4 TABLET, FILM COATED ORAL EVERY 6 HOURS PRN
Status: DISCONTINUED | OUTPATIENT
Start: 2022-05-16 | End: 2022-05-17 | Stop reason: HOSPADM

## 2022-05-16 RX ORDER — NITROGLYCERIN 20 MG/100ML
5-200 INJECTION INTRAVENOUS
Status: DISCONTINUED | OUTPATIENT
Start: 2022-05-16 | End: 2022-05-17

## 2022-05-16 RX ORDER — SODIUM CHLORIDE 9 MG/ML
250 INJECTION, SOLUTION INTRAVENOUS ONCE AS NEEDED
Status: DISCONTINUED | OUTPATIENT
Start: 2022-05-16 | End: 2022-05-17 | Stop reason: HOSPADM

## 2022-05-16 RX ORDER — CHLORHEXIDINE GLUCONATE 0.12 MG/ML
15 RINSE ORAL ONCE
Status: COMPLETED | OUTPATIENT
Start: 2022-05-16 | End: 2022-05-16

## 2022-05-16 RX ORDER — LIDOCAINE HYDROCHLORIDE 10 MG/ML
INJECTION, SOLUTION EPIDURAL; INFILTRATION; INTRACAUDAL; PERINEURAL AS NEEDED
Status: DISCONTINUED | OUTPATIENT
Start: 2022-05-16 | End: 2022-05-16 | Stop reason: SURG

## 2022-05-16 RX ORDER — DOCUSATE SODIUM 100 MG/1
100 CAPSULE, LIQUID FILLED ORAL 2 TIMES DAILY
Status: DISCONTINUED | OUTPATIENT
Start: 2022-05-16 | End: 2022-05-17 | Stop reason: HOSPADM

## 2022-05-16 RX ORDER — HYDRALAZINE HYDROCHLORIDE 20 MG/ML
10 INJECTION INTRAMUSCULAR; INTRAVENOUS EVERY 6 HOURS PRN
Status: DISCONTINUED | OUTPATIENT
Start: 2022-05-16 | End: 2022-05-17 | Stop reason: HOSPADM

## 2022-05-16 RX ORDER — FINASTERIDE 5 MG/1
5 TABLET, FILM COATED ORAL DAILY
Status: DISCONTINUED | OUTPATIENT
Start: 2022-05-16 | End: 2022-05-17 | Stop reason: HOSPADM

## 2022-05-16 RX ORDER — METOPROLOL SUCCINATE 50 MG/1
50 TABLET, EXTENDED RELEASE ORAL DAILY
Status: DISCONTINUED | OUTPATIENT
Start: 2022-05-16 | End: 2022-05-17

## 2022-05-16 RX ORDER — SODIUM CHLORIDE 9 MG/ML
9 INJECTION, SOLUTION INTRAVENOUS ONCE
Status: COMPLETED | OUTPATIENT
Start: 2022-05-16 | End: 2022-05-16

## 2022-05-16 RX ORDER — EPINEPHRINE 0.1 MG/ML
SYRINGE (ML) INJECTION AS NEEDED
Status: DISCONTINUED | OUTPATIENT
Start: 2022-05-16 | End: 2022-05-16 | Stop reason: SURG

## 2022-05-16 RX ORDER — MAGNESIUM HYDROXIDE 1200 MG/15ML
LIQUID ORAL AS NEEDED
Status: DISCONTINUED | OUTPATIENT
Start: 2022-05-16 | End: 2022-05-16 | Stop reason: HOSPADM

## 2022-05-16 RX ORDER — SODIUM CHLORIDE 9 MG/ML
INJECTION, SOLUTION INTRAVENOUS CONTINUOUS PRN
Status: DISCONTINUED | OUTPATIENT
Start: 2022-05-16 | End: 2022-05-16 | Stop reason: SURG

## 2022-05-16 RX ORDER — ATORVASTATIN CALCIUM 40 MG/1
40 TABLET, FILM COATED ORAL DAILY
Status: DISCONTINUED | OUTPATIENT
Start: 2022-05-16 | End: 2022-05-17 | Stop reason: HOSPADM

## 2022-05-16 RX ORDER — SODIUM CHLORIDE 9 MG/ML
INJECTION, SOLUTION INTRAVENOUS AS NEEDED
Status: DISCONTINUED | OUTPATIENT
Start: 2022-05-16 | End: 2022-05-16 | Stop reason: HOSPADM

## 2022-05-16 RX ORDER — HEPARIN SOD,PORCINE/0.9 % NACL 25000/250
14.5 INTRAVENOUS SOLUTION INTRAVENOUS
Status: DISCONTINUED | OUTPATIENT
Start: 2022-05-16 | End: 2022-05-17

## 2022-05-16 RX ORDER — DEXAMETHASONE SODIUM PHOSPHATE 4 MG/ML
INJECTION, SOLUTION INTRA-ARTICULAR; INTRALESIONAL; INTRAMUSCULAR; INTRAVENOUS; SOFT TISSUE AS NEEDED
Status: DISCONTINUED | OUTPATIENT
Start: 2022-05-16 | End: 2022-05-16 | Stop reason: SURG

## 2022-05-16 RX ORDER — FAMOTIDINE 20 MG/1
20 TABLET, FILM COATED ORAL ONCE
Status: COMPLETED | OUTPATIENT
Start: 2022-05-16 | End: 2022-05-16

## 2022-05-16 RX ORDER — ONDANSETRON 2 MG/ML
INJECTION INTRAMUSCULAR; INTRAVENOUS AS NEEDED
Status: DISCONTINUED | OUTPATIENT
Start: 2022-05-16 | End: 2022-05-16 | Stop reason: SURG

## 2022-05-16 RX ORDER — ROCURONIUM BROMIDE 10 MG/ML
INJECTION, SOLUTION INTRAVENOUS AS NEEDED
Status: DISCONTINUED | OUTPATIENT
Start: 2022-05-16 | End: 2022-05-16 | Stop reason: SURG

## 2022-05-16 RX ORDER — CHLORHEXIDINE GLUCONATE 500 MG/1
1 CLOTH TOPICAL EVERY 12 HOURS PRN
Status: DISCONTINUED | OUTPATIENT
Start: 2022-05-16 | End: 2022-05-16 | Stop reason: HOSPADM

## 2022-05-16 RX ORDER — ETOMIDATE 2 MG/ML
INJECTION INTRAVENOUS AS NEEDED
Status: DISCONTINUED | OUTPATIENT
Start: 2022-05-16 | End: 2022-05-16 | Stop reason: SURG

## 2022-05-16 RX ORDER — MIDAZOLAM HYDROCHLORIDE 1 MG/ML
0.5 INJECTION INTRAMUSCULAR; INTRAVENOUS
Status: DISCONTINUED | OUTPATIENT
Start: 2022-05-16 | End: 2022-05-16 | Stop reason: HOSPADM

## 2022-05-16 RX ORDER — DOCUSATE SODIUM 100 MG/1
100 CAPSULE, LIQUID FILLED ORAL 2 TIMES DAILY
Status: DISCONTINUED | OUTPATIENT
Start: 2022-05-16 | End: 2022-05-16 | Stop reason: SDUPTHER

## 2022-05-16 RX ORDER — SODIUM CHLORIDE 0.9 % (FLUSH) 0.9 %
10 SYRINGE (ML) INJECTION EVERY 12 HOURS SCHEDULED
Status: DISCONTINUED | OUTPATIENT
Start: 2022-05-16 | End: 2022-05-16 | Stop reason: HOSPADM

## 2022-05-16 RX ORDER — HEPARIN SODIUM 1000 [USP'U]/ML
INJECTION, SOLUTION INTRAVENOUS; SUBCUTANEOUS AS NEEDED
Status: DISCONTINUED | OUTPATIENT
Start: 2022-05-16 | End: 2022-05-16 | Stop reason: SURG

## 2022-05-16 RX ORDER — LEVOTHYROXINE SODIUM 0.07 MG/1
75 TABLET ORAL DAILY
Status: DISCONTINUED | OUTPATIENT
Start: 2022-05-17 | End: 2022-05-17 | Stop reason: HOSPADM

## 2022-05-16 RX ORDER — NITROGLYCERIN 0.4 MG/1
0.4 TABLET SUBLINGUAL
Status: DISCONTINUED | OUTPATIENT
Start: 2022-05-16 | End: 2022-05-16 | Stop reason: HOSPADM

## 2022-05-16 RX ORDER — ONDANSETRON 2 MG/ML
4 INJECTION INTRAMUSCULAR; INTRAVENOUS EVERY 6 HOURS PRN
Status: DISCONTINUED | OUTPATIENT
Start: 2022-05-16 | End: 2022-05-17 | Stop reason: HOSPADM

## 2022-05-16 RX ORDER — PROTAMINE SULFATE 10 MG/ML
INJECTION, SOLUTION INTRAVENOUS AS NEEDED
Status: DISCONTINUED | OUTPATIENT
Start: 2022-05-16 | End: 2022-05-16 | Stop reason: SURG

## 2022-05-16 RX ORDER — ACETAMINOPHEN 325 MG/1
650 TABLET ORAL EVERY 4 HOURS PRN
Status: DISCONTINUED | OUTPATIENT
Start: 2022-05-16 | End: 2022-05-17 | Stop reason: HOSPADM

## 2022-05-16 RX ORDER — TAMSULOSIN HYDROCHLORIDE 0.4 MG/1
0.4 CAPSULE ORAL DAILY
Status: DISCONTINUED | OUTPATIENT
Start: 2022-05-16 | End: 2022-05-17 | Stop reason: HOSPADM

## 2022-05-16 RX ORDER — POLYETHYLENE GLYCOL 3350 17 G/17G
17 POWDER, FOR SOLUTION ORAL DAILY
Status: DISCONTINUED | OUTPATIENT
Start: 2022-05-16 | End: 2022-05-17 | Stop reason: HOSPADM

## 2022-05-16 RX ADMIN — SUGAMMADEX 200 MG: 100 INJECTION, SOLUTION INTRAVENOUS at 12:25

## 2022-05-16 RX ADMIN — LIDOCAINE HYDROCHLORIDE 0.2 ML: 10 INJECTION, SOLUTION EPIDURAL; INFILTRATION; INTRACAUDAL; PERINEURAL at 09:30

## 2022-05-16 RX ADMIN — SODIUM CHLORIDE 9 ML/HR: 9 INJECTION, SOLUTION INTRAVENOUS at 09:30

## 2022-05-16 RX ADMIN — DEXAMETHASONE SODIUM PHOSPHATE 8 MG: 4 INJECTION, SOLUTION INTRA-ARTICULAR; INTRALESIONAL; INTRAMUSCULAR; INTRAVENOUS; SOFT TISSUE at 11:33

## 2022-05-16 RX ADMIN — SODIUM CHLORIDE 100 ML/HR: 9 INJECTION, SOLUTION INTRAVENOUS at 13:36

## 2022-05-16 RX ADMIN — ONDANSETRON 4 MG: 2 INJECTION INTRAMUSCULAR; INTRAVENOUS at 12:22

## 2022-05-16 RX ADMIN — ROCURONIUM BROMIDE 70 MG: 10 INJECTION, SOLUTION INTRAVENOUS at 11:33

## 2022-05-16 RX ADMIN — SODIUM CHLORIDE: 9 INJECTION, SOLUTION INTRAVENOUS at 11:02

## 2022-05-16 RX ADMIN — FINASTERIDE 5 MG: 5 TABLET, FILM COATED ORAL at 13:38

## 2022-05-16 RX ADMIN — TAMSULOSIN HYDROCHLORIDE 0.4 MG: 0.4 CAPSULE ORAL at 13:38

## 2022-05-16 RX ADMIN — HEPARIN SODIUM 11.5 UNITS/KG/HR: 5000 INJECTION, SOLUTION INTRAVENOUS; SUBCUTANEOUS at 18:11

## 2022-05-16 RX ADMIN — FENTANYL CITRATE 100 MCG: 50 INJECTION, SOLUTION INTRAMUSCULAR; INTRAVENOUS at 11:33

## 2022-05-16 RX ADMIN — EPINEPHRINE 0.1 MG: 0.1 INJECTION INTRACARDIAC; INTRAVENOUS at 11:45

## 2022-05-16 RX ADMIN — PHENYLEPHRINE HYDROCHLORIDE 0.3 MCG/KG/MIN: 10 INJECTION INTRAVENOUS at 11:36

## 2022-05-16 RX ADMIN — SACUBITRIL AND VALSARTAN 1 TABLET: 49; 51 TABLET, FILM COATED ORAL at 20:44

## 2022-05-16 RX ADMIN — EPINEPHRINE 0.05 MCG/KG/MIN: 1 INJECTION, SOLUTION, CONCENTRATE INTRAVENOUS at 11:46

## 2022-05-16 RX ADMIN — ETOMIDATE 26 MG: 2 INJECTION, SOLUTION INTRAVENOUS at 11:33

## 2022-05-16 RX ADMIN — MUPIROCIN 1 APPLICATION: 20 OINTMENT TOPICAL at 09:43

## 2022-05-16 RX ADMIN — FAMOTIDINE 20 MG: 20 TABLET ORAL at 09:43

## 2022-05-16 RX ADMIN — DOCUSATE SODIUM 100 MG: 100 CAPSULE, LIQUID FILLED ORAL at 20:44

## 2022-05-16 RX ADMIN — CEFAZOLIN SODIUM 2 G: 2 INJECTION, SOLUTION INTRAVENOUS at 11:36

## 2022-05-16 RX ADMIN — HEPARIN SODIUM 11000 UNITS: 1000 INJECTION, SOLUTION INTRAVENOUS; SUBCUTANEOUS at 11:45

## 2022-05-16 RX ADMIN — LIDOCAINE HYDROCHLORIDE 80 MG: 10 INJECTION, SOLUTION EPIDURAL; INFILTRATION; INTRACAUDAL; PERINEURAL at 11:33

## 2022-05-16 RX ADMIN — ATORVASTATIN CALCIUM 40 MG: 40 TABLET, FILM COATED ORAL at 13:38

## 2022-05-16 RX ADMIN — CHLORHEXIDINE GLUCONATE 0.12% ORAL RINSE 15 ML: 1.2 LIQUID ORAL at 09:44

## 2022-05-16 RX ADMIN — PROTAMINE SULFATE 50 MG: 10 INJECTION, SOLUTION INTRAVENOUS at 12:22

## 2022-05-16 NOTE — ANESTHESIA PROCEDURE NOTES
Airway  Urgency: elective    Date/Time: 5/16/2022 11:35 AM  Airway not difficult    General Information and Staff    Patient location during procedure: OR  CRNA/CAA: Guicho Lau CRNA    Indications and Patient Condition  Indications for airway management: airway protection    Preoxygenated: yes  MILS not maintained throughout  Mask difficulty assessment: 2 - vent by mask + OA or adjuvant +/- NMBA    Final Airway Details  Final airway type: endotracheal airway      Successful airway: ETT  Cuffed: yes   Successful intubation technique: direct laryngoscopy  Endotracheal tube insertion site: oral  Blade: Drew  Blade size: 2  ETT size (mm): 8.0  Cormack-Lehane Classification: grade I - full view of glottis  Placement verified by: chest auscultation and capnometry   Measured from: lips  ETT/EBT  to lips (cm): 20  Number of attempts at approach: 1  Assessment: lips, teeth, and gum same as pre-op and atraumatic intubation    Additional Comments  Negative epigastric sounds, Breath sound equal bilaterally with symmetric chest rise and fall

## 2022-05-16 NOTE — ANESTHESIA POSTPROCEDURE EVALUATION
Patient: Zack Webster    Procedure Summary     Date: 05/16/22 Room / Location: [facility] OR 01 / [facility] HYBRID GIOVANA    Anesthesia Start: 1126 Anesthesia Stop:     Procedures:       TRANSCATHETER AORTIC VALVE REPLACEMENT (N/A Chest)      TRANSESOPHAGEAL ECHOCARDIOGRAM WITH ANESTHESIA (N/A Chest)      Transcatheter Aortic Valve Replacement (N/A ) Diagnosis:       Aortic stenosis, severe      (Aortic stenosis, severe [I35.0])    Surgeons: Percy Green MD; Adwoa Reynoso MD Provider: Bucky Briseno MD    Anesthesia Type: general ASA Status: 4          Anesthesia Type: general    Vitals  No vitals data found for the desired time range.  /66  T 97F  RR 16  SpO2 100% 2L NC  HR 69 SR        Post Anesthesia Care and Evaluation    Patient location during evaluation: ICU  Patient participation: complete - patient participated  Level of consciousness: awake and alert  Pain score: 0  Pain management: adequate  Airway patency: patent  Anesthetic complications: No anesthetic complications  PONV Status: none  Cardiovascular status: hemodynamically stable and acceptable  Respiratory status: nonlabored ventilation, acceptable, nasal cannula and spontaneous ventilation  Hydration status: acceptable    Comments: Handoff given to ICU RN at bedside.

## 2022-05-16 NOTE — ANESTHESIA PREPROCEDURE EVALUATION
Anesthesia Evaluation     Patient summary reviewed and Nursing notes reviewed                Airway   Mallampati: I  TM distance: >3 FB  Neck ROM: full  No difficulty expected  Dental - normal exam     Pulmonary - normal exam   (+) pleural effusion,   Cardiovascular - normal exam    (+) hypertension, valvular problems/murmurs AS, CAD, CHF , hyperlipidemia,       Neuro/Psych  (+) psychiatric history Anxiety and Depression,    GI/Hepatic/Renal/Endo    (+)  GERD, PUD, GI bleeding , diabetes mellitus, thyroid problem hypothyroidism    Musculoskeletal (-) negative ROS    Abdominal  - normal exam    Bowel sounds: normal.   Substance History - negative use     OB/GYN negative ob/gyn ROS         Other                        Anesthesia Plan    ASA 4     general   (KONRAD, ALVINO)  intravenous induction     Anesthetic plan, all risks, benefits, and alternatives have been provided, discussed and informed consent has been obtained with: patient.    Plan discussed with CRNA.        CODE STATUS:

## 2022-05-16 NOTE — ANESTHESIA PROCEDURE NOTES
Emergent/Open-Heart Anesthesia ALVINO    Procedure Performed: Emergent/Open-Heart Anesthesia ALVINO     Start Time:        End Time:        General Procedure Information  Physician Requesting Echo: Percy Green MD  Intubated  Bite block not placed  Heart visualized  Probe Insertion:  Easy  Probe Type:  Multiplane  Modalities:  Pulse wave Doppler, continuous wave Doppler, color flow mapping and 2D only    Echocardiographic and Doppler Measurements    Ventricles    Left Ventricle:  Global Function severely impaired.  Ejection Fraction 25%.          Valves    Other Valve Findings:       CALCIFIED AV  SEVERE AS  AV ANNULUS 2.9  AV PEAK GRAD 64, MEAN 40  LVOT PEAK GRAD 1, MEAN 0  RADHA 0.78 BY VTI    MOD-SEVERE MR  CENTRAL JET  VC 0.5 CM  JET AREA 11.4    Aorta    Ascending Aorta:  Diameter 4.84 cm.    Aortic Arch:  Diameter 3.71 cm.    Descending Aorta:  Diameter 2.75 cm.                          Anesthesia Information  Performed Personally  Anesthesiologist:  Bucky Briseno MD      Echocardiogram Comments:       POST TAVR DEPLOYMENT:  PROSTHETIC VALVE IN AORTIC POSITION  ALL LEAFLETS VISUALIZED AND MOVING  NO SIGNIFICANT PARAVALVULAR LEAKS  AV PEAK GRAD 11, MEAN 6  LVOT PEAK GRAD 2, MEAN 1  RADHA 2.90 BY VTI  NO INCREASE IN PERICARDIAL FLUID FROM INITIAL EXAM  EXAM OTHERWISE UNCHANGED.

## 2022-05-17 ENCOUNTER — APPOINTMENT (OUTPATIENT)
Dept: GENERAL RADIOLOGY | Facility: HOSPITAL | Age: 82
End: 2022-05-17

## 2022-05-17 ENCOUNTER — READMISSION MANAGEMENT (OUTPATIENT)
Dept: CALL CENTER | Facility: HOSPITAL | Age: 82
End: 2022-05-17

## 2022-05-17 VITALS
OXYGEN SATURATION: 96 % | TEMPERATURE: 97.6 F | DIASTOLIC BLOOD PRESSURE: 72 MMHG | RESPIRATION RATE: 20 BRPM | HEART RATE: 70 BPM | SYSTOLIC BLOOD PRESSURE: 126 MMHG

## 2022-05-17 PROBLEM — N13.8 BPH WITH URINARY OBSTRUCTION: Status: ACTIVE | Noted: 2022-05-17

## 2022-05-17 PROBLEM — N40.1 BPH WITH URINARY OBSTRUCTION: Status: ACTIVE | Noted: 2022-05-17

## 2022-05-17 LAB
ACT BLD: 148 SECONDS (ref 82–152)
ANION GAP SERPL CALCULATED.3IONS-SCNC: 10 MMOL/L (ref 5–15)
APTT PPP: 33.5 SECONDS (ref 60–90)
BASOPHILS # BLD AUTO: 0.01 10*3/MM3 (ref 0–0.2)
BASOPHILS NFR BLD AUTO: 0.1 % (ref 0–1.5)
BH BB BLOOD EXPIRATION DATE: NORMAL
BH BB BLOOD EXPIRATION DATE: NORMAL
BH BB BLOOD TYPE BARCODE: 6200
BH BB BLOOD TYPE BARCODE: 6200
BH BB DISPENSE STATUS: NORMAL
BH BB DISPENSE STATUS: NORMAL
BH BB PRODUCT CODE: NORMAL
BH BB PRODUCT CODE: NORMAL
BH BB UNIT NUMBER: NORMAL
BH BB UNIT NUMBER: NORMAL
BUN SERPL-MCNC: 25 MG/DL (ref 8–23)
BUN/CREAT SERPL: 26.3 (ref 7–25)
CALCIUM SPEC-SCNC: 7.6 MG/DL (ref 8.6–10.5)
CHLORIDE SERPL-SCNC: 111 MMOL/L (ref 98–107)
CO2 SERPL-SCNC: 19 MMOL/L (ref 22–29)
CREAT SERPL-MCNC: 0.95 MG/DL (ref 0.76–1.27)
CROSSMATCH INTERPRETATION: NORMAL
CROSSMATCH INTERPRETATION: NORMAL
DEPRECATED RDW RBC AUTO: 44.5 FL (ref 37–54)
EGFRCR SERPLBLD CKD-EPI 2021: 79.9 ML/MIN/1.73
EOSINOPHIL # BLD AUTO: 0 10*3/MM3 (ref 0–0.4)
EOSINOPHIL NFR BLD AUTO: 0 % (ref 0.3–6.2)
ERYTHROCYTE [DISTWIDTH] IN BLOOD BY AUTOMATED COUNT: 13.2 % (ref 12.3–15.4)
GLUCOSE SERPL-MCNC: 197 MG/DL (ref 65–99)
HCT VFR BLD AUTO: 32.3 % (ref 37.5–51)
HGB BLD-MCNC: 10.9 G/DL (ref 13–17.7)
IMM GRANULOCYTES # BLD AUTO: 0.02 10*3/MM3 (ref 0–0.05)
IMM GRANULOCYTES NFR BLD AUTO: 0.3 % (ref 0–0.5)
LYMPHOCYTES # BLD AUTO: 0.38 10*3/MM3 (ref 0.7–3.1)
LYMPHOCYTES NFR BLD AUTO: 5 % (ref 19.6–45.3)
MAGNESIUM SERPL-MCNC: 1.8 MG/DL (ref 1.6–2.4)
MCH RBC QN AUTO: 30.9 PG (ref 26.6–33)
MCHC RBC AUTO-ENTMCNC: 33.7 G/DL (ref 31.5–35.7)
MCV RBC AUTO: 91.5 FL (ref 79–97)
MONOCYTES # BLD AUTO: 0.27 10*3/MM3 (ref 0.1–0.9)
MONOCYTES NFR BLD AUTO: 3.5 % (ref 5–12)
NEUTROPHILS NFR BLD AUTO: 6.93 10*3/MM3 (ref 1.7–7)
NEUTROPHILS NFR BLD AUTO: 91.1 % (ref 42.7–76)
NRBC BLD AUTO-RTO: 0 /100 WBC (ref 0–0.2)
PHOSPHATE SERPL-MCNC: 2.6 MG/DL (ref 2.5–4.5)
PLATELET # BLD AUTO: 162 10*3/MM3 (ref 140–450)
PMV BLD AUTO: 11.3 FL (ref 6–12)
POTASSIUM SERPL-SCNC: 4 MMOL/L (ref 3.5–5.2)
QT INTERVAL: 522 MS
QT INTERVAL: 536 MS
QTC INTERVAL: 512 MS
QTC INTERVAL: 548 MS
RBC # BLD AUTO: 3.53 10*6/MM3 (ref 4.14–5.8)
SODIUM SERPL-SCNC: 140 MMOL/L (ref 136–145)
UFH PPP CHRO-ACNC: 0.1 IU/ML (ref 0.3–0.7)
UNIT  ABO: NORMAL
UNIT  ABO: NORMAL
UNIT  RH: NORMAL
UNIT  RH: NORMAL
WBC NRBC COR # BLD: 7.61 10*3/MM3 (ref 3.4–10.8)

## 2022-05-17 PROCEDURE — 99233 SBSQ HOSP IP/OBS HIGH 50: CPT | Performed by: INTERNAL MEDICINE

## 2022-05-17 PROCEDURE — 25010000002 HYDRALAZINE PER 20 MG: Performed by: STUDENT IN AN ORGANIZED HEALTH CARE EDUCATION/TRAINING PROGRAM

## 2022-05-17 PROCEDURE — 85025 COMPLETE CBC W/AUTO DIFF WBC: CPT | Performed by: INTERNAL MEDICINE

## 2022-05-17 PROCEDURE — 85347 COAGULATION TIME ACTIVATED: CPT

## 2022-05-17 PROCEDURE — 83735 ASSAY OF MAGNESIUM: CPT | Performed by: INTERNAL MEDICINE

## 2022-05-17 PROCEDURE — 0 MAGNESIUM SULFATE 4 GM/100ML SOLUTION: Performed by: THORACIC SURGERY (CARDIOTHORACIC VASCULAR SURGERY)

## 2022-05-17 PROCEDURE — 99231 SBSQ HOSP IP/OBS SF/LOW 25: CPT | Performed by: THORACIC SURGERY (CARDIOTHORACIC VASCULAR SURGERY)

## 2022-05-17 PROCEDURE — 71045 X-RAY EXAM CHEST 1 VIEW: CPT

## 2022-05-17 PROCEDURE — 80048 BASIC METABOLIC PNL TOTAL CA: CPT | Performed by: STUDENT IN AN ORGANIZED HEALTH CARE EDUCATION/TRAINING PROGRAM

## 2022-05-17 PROCEDURE — 84100 ASSAY OF PHOSPHORUS: CPT | Performed by: INTERNAL MEDICINE

## 2022-05-17 PROCEDURE — 85730 THROMBOPLASTIN TIME PARTIAL: CPT | Performed by: INTERNAL MEDICINE

## 2022-05-17 PROCEDURE — 93005 ELECTROCARDIOGRAM TRACING: CPT | Performed by: INTERNAL MEDICINE

## 2022-05-17 PROCEDURE — 93010 ELECTROCARDIOGRAM REPORT: CPT | Performed by: INTERNAL MEDICINE

## 2022-05-17 PROCEDURE — 93005 ELECTROCARDIOGRAM TRACING: CPT | Performed by: STUDENT IN AN ORGANIZED HEALTH CARE EDUCATION/TRAINING PROGRAM

## 2022-05-17 PROCEDURE — 85520 HEPARIN ASSAY: CPT

## 2022-05-17 PROCEDURE — 97162 PT EVAL MOD COMPLEX 30 MIN: CPT

## 2022-05-17 PROCEDURE — 97530 THERAPEUTIC ACTIVITIES: CPT

## 2022-05-17 PROCEDURE — 99232 SBSQ HOSP IP/OBS MODERATE 35: CPT | Performed by: INTERNAL MEDICINE

## 2022-05-17 PROCEDURE — 99239 HOSP IP/OBS DSCHRG MGMT >30: CPT | Performed by: NURSE PRACTITIONER

## 2022-05-17 RX ORDER — METOPROLOL SUCCINATE 25 MG/1
12.5 TABLET, EXTENDED RELEASE ORAL DAILY
Qty: 60 TABLET | Refills: 4 | Status: SHIPPED | OUTPATIENT
Start: 2022-05-17 | End: 2022-05-17 | Stop reason: SDUPTHER

## 2022-05-17 RX ORDER — NITROFURANTOIN 25; 75 MG/1; MG/1
100 CAPSULE ORAL EVERY 12 HOURS SCHEDULED
Status: DISCONTINUED | OUTPATIENT
Start: 2022-05-17 | End: 2022-05-17 | Stop reason: HOSPADM

## 2022-05-17 RX ORDER — METOPROLOL SUCCINATE 25 MG/1
12.5 TABLET, EXTENDED RELEASE ORAL DAILY
Qty: 45 TABLET | Refills: 3 | Status: CANCELLED | OUTPATIENT
Start: 2022-05-17

## 2022-05-17 RX ORDER — MAGNESIUM SULFATE HEPTAHYDRATE 40 MG/ML
4 INJECTION, SOLUTION INTRAVENOUS AS NEEDED
Status: DISCONTINUED | OUTPATIENT
Start: 2022-05-17 | End: 2022-05-17 | Stop reason: HOSPADM

## 2022-05-17 RX ORDER — ASPIRIN 81 MG/1
81 TABLET ORAL DAILY
Qty: 30 TABLET | Refills: 11 | Status: SHIPPED | OUTPATIENT
Start: 2022-05-17

## 2022-05-17 RX ORDER — MAGNESIUM SULFATE HEPTAHYDRATE 40 MG/ML
2 INJECTION, SOLUTION INTRAVENOUS AS NEEDED
Status: DISCONTINUED | OUTPATIENT
Start: 2022-05-17 | End: 2022-05-17 | Stop reason: HOSPADM

## 2022-05-17 RX ORDER — NITROFURANTOIN 25; 75 MG/1; MG/1
100 CAPSULE ORAL EVERY 12 HOURS SCHEDULED
Qty: 14 CAPSULE | Refills: 0 | Status: SHIPPED | OUTPATIENT
Start: 2022-05-17 | End: 2022-05-17 | Stop reason: SDUPTHER

## 2022-05-17 RX ORDER — METOPROLOL SUCCINATE 25 MG/1
12.5 TABLET, EXTENDED RELEASE ORAL
Status: DISCONTINUED | OUTPATIENT
Start: 2022-05-17 | End: 2022-05-17 | Stop reason: HOSPADM

## 2022-05-17 RX ORDER — ASPIRIN 81 MG/1
81 TABLET ORAL DAILY
Qty: 30 TABLET | Refills: 11 | Status: SHIPPED | OUTPATIENT
Start: 2022-05-17 | End: 2022-05-17 | Stop reason: SDUPTHER

## 2022-05-17 RX ORDER — METOPROLOL SUCCINATE 25 MG/1
12.5 TABLET, EXTENDED RELEASE ORAL DAILY
Qty: 60 TABLET | Refills: 4 | Status: SHIPPED | OUTPATIENT
Start: 2022-05-17 | End: 2023-02-08 | Stop reason: SDUPTHER

## 2022-05-17 RX ORDER — SACUBITRIL AND VALSARTAN 49; 51 MG/1; MG/1
1 TABLET, FILM COATED ORAL 2 TIMES DAILY
Qty: 180 TABLET | Refills: 3 | Status: SHIPPED | OUTPATIENT
Start: 2022-05-17 | End: 2023-04-03

## 2022-05-17 RX ORDER — NITROFURANTOIN 25; 75 MG/1; MG/1
100 CAPSULE ORAL EVERY 12 HOURS SCHEDULED
Qty: 14 CAPSULE | Refills: 0 | Status: SHIPPED | OUTPATIENT
Start: 2022-05-17 | End: 2022-05-24

## 2022-05-17 RX ORDER — ATORVASTATIN CALCIUM 40 MG/1
40 TABLET, FILM COATED ORAL DAILY
Qty: 90 TABLET | Refills: 3 | Status: SHIPPED | OUTPATIENT
Start: 2022-05-17 | End: 2022-10-05 | Stop reason: DRUGHIGH

## 2022-05-17 RX ADMIN — METOPROLOL SUCCINATE 12.5 MG: 25 TABLET, EXTENDED RELEASE ORAL at 15:14

## 2022-05-17 RX ADMIN — HYDRALAZINE HYDROCHLORIDE 10 MG: 20 INJECTION INTRAMUSCULAR; INTRAVENOUS at 05:03

## 2022-05-17 RX ADMIN — FINASTERIDE 5 MG: 5 TABLET, FILM COATED ORAL at 08:07

## 2022-05-17 RX ADMIN — SACUBITRIL AND VALSARTAN 1 TABLET: 49; 51 TABLET, FILM COATED ORAL at 08:06

## 2022-05-17 RX ADMIN — NITROFURANTOIN (MONOHYDRATE/MACROCRYSTALS) 100 MG: 75; 25 CAPSULE ORAL at 12:30

## 2022-05-17 RX ADMIN — TAMSULOSIN HYDROCHLORIDE 0.4 MG: 0.4 CAPSULE ORAL at 08:06

## 2022-05-17 RX ADMIN — POLYETHYLENE GLYCOL 3350 17 G: 17 POWDER, FOR SOLUTION ORAL at 08:06

## 2022-05-17 RX ADMIN — ASPIRIN 81 MG: 81 TABLET, COATED ORAL at 08:06

## 2022-05-17 RX ADMIN — LEVOTHYROXINE SODIUM 75 MCG: 0.07 TABLET ORAL at 08:06

## 2022-05-17 RX ADMIN — PANTOPRAZOLE SODIUM 40 MG: 40 TABLET, DELAYED RELEASE ORAL at 06:33

## 2022-05-17 RX ADMIN — DOCUSATE SODIUM 100 MG: 100 CAPSULE, LIQUID FILLED ORAL at 08:06

## 2022-05-17 RX ADMIN — ATORVASTATIN CALCIUM 40 MG: 40 TABLET, FILM COATED ORAL at 08:06

## 2022-05-17 RX ADMIN — MAGNESIUM SULFATE 4 G: 4 INJECTION INTRAVENOUS at 04:14

## 2022-05-18 NOTE — OUTREACH NOTE
Prep Survey    Flowsheet Row Responses   [facility] facility patient discharged from? [facility]   Is LACE score < 7 ? No   Emergency Room discharge w/ pulse ox? No   Eligibility Readm Mgmt   Discharge diagnosis TAVR    Does the patient have one of the following disease processes/diagnoses(primary or secondary)? Cardiothoracic surgery   Does the patient have Home health ordered? Yes   What is the Home health agency?  [facility]    Is there a DME ordered? No   Prep survey completed? Yes          VAISHNAVI HOFFMAN - Registered Nurse

## 2022-05-23 ENCOUNTER — OFFICE VISIT (OUTPATIENT)
Dept: CARDIOLOGY | Facility: HOSPITAL | Age: 82
End: 2022-05-23

## 2022-05-23 ENCOUNTER — READMISSION MANAGEMENT (OUTPATIENT)
Dept: CALL CENTER | Facility: HOSPITAL | Age: 82
End: 2022-05-23

## 2022-05-23 VITALS
BODY MASS INDEX: 25.2 KG/M2 | SYSTOLIC BLOOD PRESSURE: 118 MMHG | WEIGHT: 176 LBS | HEART RATE: 71 BPM | HEIGHT: 70 IN | DIASTOLIC BLOOD PRESSURE: 75 MMHG

## 2022-05-23 DIAGNOSIS — I25.810 CORONARY ARTERY DISEASE INVOLVING CORONARY BYPASS GRAFT OF NATIVE HEART WITHOUT ANGINA PECTORIS: ICD-10-CM

## 2022-05-23 DIAGNOSIS — Z95.3 S/P TAVR (TRANSCATHETER AORTIC VALVE REPLACEMENT), BIOPROSTHETIC: Primary | ICD-10-CM

## 2022-05-23 DIAGNOSIS — I50.42 CHRONIC COMBINED SYSTOLIC AND DIASTOLIC CONGESTIVE HEART FAILURE: ICD-10-CM

## 2022-05-23 PROCEDURE — 99442 PR PHYS/QHP TELEPHONE EVALUATION 11-20 MIN: CPT | Performed by: NURSE PRACTITIONER

## 2022-05-23 NOTE — PROGRESS NOTES
[facility]  Heart and Valve Center  Telemedicine note    05/23/2022       Zack Webster  [address] [facility] [address]      1940    Provider, No Known    Zack Webster is a 81 y.o. male.      Subjective:     Chief Complaint:  Hospital Follow Up Visit (TAVR 5/16/22)       This was an telephone enabled telemedicine encounter. You have chosen to receive care through a telephone visit. Do you consent to use a telephone visit for your medical care today? Yes    Mr. Webster is s/p TAVR on 5/16/22.  DC home POD #1.  He states he is feeling better overall and that his peripheral edema has improved along with his breathing.  He also states when he gets tired, he seems to recover quicker than before surgery.  Patient states lower extremity edema much improved.  Sleeping well.      Later this week he is scheduled to see PCP tomorrow then Urology on Wednesday.     PROBLEM LIST:  1. Aortic stenosis  a. TTE 4/2021, STJ: EF 35-40%, grade II diastolic dysfunction, moderate to severe MR, mild AI, moderate AS (mean PG 28.08 mmHg, peak 44.64 mmHg), moderate TR, RVSP 64.85 mmHg.    b. ALVINO 4/8/22: LVEF 25%, aortic annulus 2.7 cm, AV peak velocity 3.64 m/sec, AV mean gradient 32 mm Hg, moderate to severe MR  c. Dobutamine stress echo 4/27/22: Ejection fraction increase from 25/5 to 35%, mean gradient 40.5 mm Hg with maximal velocity 4.01 m/sec.  Calculated RADHA 0.7 cm2.  Stroke volume increased from 56 ml to 75 ml  d. TAVR 5/16/22 utilizing Melgar Liban 3 pericardial prosthesis size 29 mm   2. Combined systolic & diastolic heart failure  a. Echo 4/2021, STJ: EF 35-40%, grade II diastolic dysfunction, moderate to severe MR, mild AI, moderate AS (mean PG 28.08 mmHg, peak 44.64 mmHg), moderate TR, RVSP 64.85 mmHg.   b. ALVINO 4/8/22: LVEF 25%, AV annulus 2.7 cm, AV mean 32 mm Hg, Vmax 3.64 m/sec  c. Dobutamine GXT 4/27/22: LVEF 25% @ baseline and increase to 35% @ dobutamine dose 20 mcg/kg/min  d. Current treatment:  Entresto, Toprol xl, Januvia, demadex/kdur  3. CAD  A.  Cardiac cath 4/8/22: RCA 30% stenosis, Circumflex minimal irregularities, LAD D2 contains 60% proximal stenosis in a vessel approximately 2 mm diameter.  4. Hyperlipidemia  5. Hypothyroidism  6. GERD with hx PUD  A.  Remote GIB with transfusion age 30's  7. Type 2 Diabetes  8. Depression  9. HTN  10.  Pancreatic cyst with pancreatic duct dilation  A.  Noted on pre-op TAVR CTA 4/27/22  B.  Scheduled for MRI pancreatic protocol 6/7/22  C.  Will refer to surgery or GI after the MRI study      Patient Active Problem List   Diagnosis   • Aortic valve disorder   • Pleural effusion due to CHF (congestive heart failure) ([facility])   • Cardiomyopathy ([facility])   • Aortic stenosis, severe   • Coronary artery disease involving coronary bypass graft of native heart without angina pectoris   • Chronic combined systolic and diastolic congestive heart failure ([facility])   • Hypertension and diastolic dysfunction   • Hyperlipidemia   • Type 2 DM without complication without use of insulin.  HbA1c 7.3 ([facility])   • AVD (aortic valve disease)   • Stage 3a chronic kidney disease ([facility])   • Moderate to severe mitral regurgitation and aortic stenosis   • S/p TAVR #29 Melgar JUDI lll tissue valve   • Severe bilateral sensorineural hearing loss   • Postoperative LBBB (left bundle branch block)   • BPH with urinary obstruction requiring chronic indwelling Joyner for 2 to 3 weeks PTA       Past Medical History:   Diagnosis Date   • Anxiety    • aortic valve stenosis 04/08/2022   • Bleeding gastric ulcer 1969   • CHF (congestive heart failure) ([facility]) 11/2021   • Coronary artery disease 04/08/2022   • Depression    • Diabetes mellitus ([facility])    • GERD (gastroesophageal reflux disease)    • Hemorrhoid    • History of blood transfusion     Had in [place]   • Hyperlipidemia    • Hypertension    • Hypothyroidism    • Pleural effusion    • Urinary catheter in place     PT HAS LEG BAG IN PLACE       Past  Surgical History:   Procedure Laterality Date   • AORTIC VALVE REPAIR/REPLACEMENT N/A 2022    Procedure: TRANSCATHETER AORTIC VALVE REPLACEMENT;  Surgeon: Percy Green MD;  Location: [facility];  Service: Cardiothoracic;  Laterality: N/A;  FLOURO 8m  DOSE 473 mGy  CONTRAST 60ml   • AORTIC VALVE REPAIR/REPLACEMENT N/A 2022    Procedure: Transcatheter Aortic Valve Replacement;  Surgeon: Adwoa Reynoso MD;  Location: [facility];  Service: Cardiovascular;  Laterality: N/A;   • CARDIAC CATHETERIZATION Left 2022    Procedure: Cardiac Catheterization/Vascular Study;  Surgeon: Adwoa Reynoso MD;  Location: [facility] CATH INVASIVE LOCATION;  Service: Cardiology;  Laterality: Left;   • COLONOSCOPY     • GALLBLADDER SURGERY      had in    • TRANSESOPHAGEAL ECHOCARDIOGRAM (ALVINO) N/A 2022    Procedure: TRANSESOPHAGEAL ECHOCARDIOGRAM WITH ANESTHESIA;  Surgeon: Percy Green MD;  Location: [facility];  Service: Cardiothoracic;  Laterality: N/A;   • UMBILICAL HERNIA REPAIR         Family History   Problem Relation Age of Onset   • Heart attack Mother    • Ulcers Father        Social History     Socioeconomic History   • Marital status:    • Number of children: 1   • Years of education: High school + some college   Tobacco Use   • Smoking status: Former Smoker     Packs/day: 0.25     Years: 0.50     Pack years: 0.12     Quit date:      Years since quittin.4   • Smokeless tobacco: Never Used   Vaping Use   • Vaping Use: Never used   Substance and Sexual Activity   • Alcohol use: Not Currently   • Drug use: Never       Allergies   Allergen Reactions   • Doxycycline Other (See Comments)     Uknown   • Levaquin [Levofloxacin] Swelling         Current Outpatient Medications:   •  aspirin 81 MG EC tablet, Take 1 tablet by mouth Daily., Disp: 30 tablet, Rfl: 11  •  atorvastatin (LIPITOR) 40 MG tablet, Take 1 tablet by mouth Daily., Disp: 90 tablet,  Rfl: 3  •  docusate sodium (COLACE) 100 MG capsule, Take 100 mg by mouth 2 (Two) Times a Day., Disp: , Rfl:   •  finasteride (PROSCAR) 5 MG tablet, Take 5 mg by mouth Daily., Disp: , Rfl:   •  Januvia 100 MG tablet, Take 100 mg by mouth Daily., Disp: , Rfl:   •  levothyroxine (SYNTHROID, LEVOTHROID) 75 MCG tablet, Take 75 mcg by mouth Daily., Disp: , Rfl:   •  metoprolol succinate XL (Toprol XL) 25 MG 24 hr tablet, Take 0.5 tablets by mouth Daily., Disp: 60 tablet, Rfl: 4  •  nitrofurantoin, macrocrystal-monohydrate, (MACROBID) 100 MG capsule, Take 1 capsule by mouth Every 12 (Twelve) Hours for 14 doses. Indications: Prevention of Frequently Occuring Urinary Tract Infections, Disp: 14 capsule, Rfl: 0  •  potassium chloride 10 MEQ CR tablet, Take 10 mEq by mouth Daily., Disp: , Rfl:   •  sacubitril-valsartan (Entresto) 49-51 MG tablet, Take 1 tablet by mouth 2 (Two) Times a Day., Disp: 180 tablet, Rfl: 3  •  tamsulosin (FLOMAX) 0.4 MG capsule 24 hr capsule, Take 1 capsule by mouth Daily., Disp: , Rfl:   •  torsemide (DEMADEX) 10 MG tablet, Take 10 mg by mouth Daily., Disp: , Rfl:   •  omeprazole (priLOSEC) 40 MG capsule, Take 40 mg by mouth Daily As Needed., Disp: , Rfl:   No current facility-administered medications for this visit.    The following portions of the patient's history were reviewed today and updated as appropriate: allergies, current medications, past family history, past medical history, past social history, past surgical history and problem list     Review of Systems   Constitutional: Positive for malaise/fatigue.        Improving   HENT: Negative.    Eyes: Negative.    Cardiovascular: Positive for dyspnea on exertion and leg swelling. Negative for chest pain, irregular heartbeat, near-syncope, orthopnea, palpitations, paroxysmal nocturnal dyspnea and syncope.        ROBERTS and lower extremity edema improved   Respiratory: Negative for cough, sleep disturbances due to breathing and wheezing.   "  Endocrine: Negative.    Hematologic/Lymphatic: Bruises/bleeds easily.   Skin: Positive for color change.        Groin sites bruising improving   Musculoskeletal: Negative.    Gastrointestinal: Negative.    Genitourinary: Negative.    Neurological: Negative.    Psychiatric/Behavioral: The patient does not have insomnia.    Allergic/Immunologic: Negative.        Objective:     Vitals:    05/23/22 0852   BP: 118/75   Pulse: 71   Weight: 79.8 kg (176 lb)   Height: 177.8 cm (70\")       Body mass index is 25.25 kg/m².    Vitals reviewed.   Constitutional:       Appearance: Not in distress.   Pulmonary:      Effort: Pulmonary effort is normal.   Neurological:      Mental Status: Alert and oriented to person, place and time.         Lab and Diagnostic Review:  Discharge Summary by Hortensia Courtney APRN (05/17/2022 09:47)        Assessment and Plan:   1. S/p TAVR #29 Melgar JUDI lll tissue valve  - steady recovery @ home  - continue mobilization  - see PCP 5/24/22  - aspirin  - CT Surgery follow up 6/9 (mri pancreatic cyst protocol 6/7)  - Cardiology follow up 6/22/22 to include echo and HVC visit for KCCQ12 and post TAVR walk test    2. Coronary artery disease involving coronary bypass graft of native heart without angina pectoris  - no angina  - asa, statin, BB    3. Chronic combined systolic and diastolic congestive heart failure (HCC)  - Symptomatically improving since DC post TAVR  - Entresto, metoprolol xl, demadex/kdur, Januvia      This visit has been scheduled as a telephone visit to comply with patient safety concerns in accordance with CDC recommendations. Total time of discussion was 20 minutes.      It has been a pleasure to participate in the care of this patient.  Patient was instructed to call the Heart and Valve Center with any questions, concerns, or worsening symptoms.          "

## 2022-05-23 NOTE — OUTREACH NOTE
CT Surgery Week 1 Survey    Flowsheet Row Responses   [facility] [place], operated by [facility] facility patient discharged from? [place]   Does the patient have one of the following disease processes/diagnoses(primary or secondary)? Cardiothoracic surgery   Week 1 attempt successful? No   Unsuccessful attempts Attempt 1            MAIRA HOFFMAN - Registered Nurse

## 2022-05-23 NOTE — PATIENT INSTRUCTIONS
On June 22nd:  You will start with your echocardiogram.  [place] at 10:45 am third floor [address].  Then come up to 5th floor [address] for a walk test and heart symptom questionnaire at noon (this will only take 10-15 minutes).  Then your appointment with Dr. Reynoso is @ 1:45 PM on 4th floor.      Please call me @ [phone] with any questions

## 2022-05-24 LAB
QT INTERVAL: 494 MS
QT INTERVAL: 530 MS
QTC INTERVAL: 502 MS
QTC INTERVAL: 513 MS

## 2022-05-26 ENCOUNTER — READMISSION MANAGEMENT (OUTPATIENT)
Dept: CALL CENTER | Facility: HOSPITAL | Age: 82
End: 2022-05-26

## 2022-05-26 NOTE — OUTREACH NOTE
CT Surgery Week 2 Survey    Flowsheet Row Responses   [facility] patient discharged from? [place]   Does the patient have one of the following disease processes/diagnoses(primary or secondary)? Cardiothoracic surgery   Week 2 attempt successful? Yes   Call start time 0830   Call end time 0834   Discharge diagnosis TAVR    Meds reviewed with patient/caregiver? Yes   Is the patient having any side effects they believe may be caused by any medication additions or changes? No   Does the patient have all medications related to this admission filled (includes all antibiotics, pain medications, cardiac medications, etc.) Yes   Is the patient taking all medications as directed (includes completed medication regime)? Yes   Does the patient have a primary care provider?  Yes   Comments regarding PCP Has completed    Has the patient kept scheduled appointments due by today? Yes   What is the Home health agency?  [facility]    Has home health visited the patient within 72 hours of discharge? Yes   Psychosocial issues? No   Did the patient receive a copy of their discharge instructions? Yes   Nursing interventions Reviewed instructions with patient   What is the patient's perception of their health status since discharge? Improving   Nursing interventions Nurse provided patient education   Is the patient/caregiver able to teach back signs and symptoms of incisional infection? Increased redness, swelling or pain at the incisonal site, Increased drainage or bleeding, Incisional warmth, Pus or odor from incision, Fever   Is the patient/caregiver able to teach back steps to recovery at home? Eat a well-balance diet, Rest and rebuild strength, gradually increase activity, Set small, achievable goals for return to baseline health   If the patient is a current smoker, are they able to teach back resources for cessation? Not a smoker   Is the patient/caregiver able to teach back the hierarchy of who to call/visit for  symptoms/problems? PCP, Specialist, Home health nurse, Urgent Care, ED, 911 Yes   Week 2 call completed? Yes   Wrap up additional comments Surgery on prostate next week. Pt reports he is doing well at this time.           KRISTYN MANCILLA - Registered Nurse

## 2022-05-27 ENCOUNTER — OUTSIDE FACILITY SERVICE (OUTPATIENT)
Dept: CARDIAC SURGERY | Facility: CLINIC | Age: 82
End: 2022-05-27

## 2022-06-05 ENCOUNTER — APPOINTMENT (OUTPATIENT)
Dept: MRI IMAGING | Facility: HOSPITAL | Age: 82
End: 2022-06-05

## 2022-06-06 ENCOUNTER — OFFICE VISIT (OUTPATIENT)
Dept: CARDIAC SURGERY | Facility: CLINIC | Age: 82
End: 2022-06-06

## 2022-06-06 VITALS
DIASTOLIC BLOOD PRESSURE: 74 MMHG | TEMPERATURE: 97.5 F | OXYGEN SATURATION: 98 % | SYSTOLIC BLOOD PRESSURE: 130 MMHG | WEIGHT: 183 LBS | HEART RATE: 72 BPM | BODY MASS INDEX: 26.2 KG/M2 | HEIGHT: 70 IN

## 2022-06-06 DIAGNOSIS — Z95.2 S/P TAVR (TRANSCATHETER AORTIC VALVE REPLACEMENT): Primary | ICD-10-CM

## 2022-06-06 PROCEDURE — 71046 X-RAY EXAM CHEST 2 VIEWS: CPT | Performed by: NURSE PRACTITIONER

## 2022-06-06 PROCEDURE — 99213 OFFICE O/P EST LOW 20 MIN: CPT | Performed by: NURSE PRACTITIONER

## 2022-06-06 NOTE — PROGRESS NOTES
[facility] Cardiothoracic Surgery Office Follow Up Note     Date of Encounter: 06/06/2022     YOB: 1940  Name: Zack Webster    PCP: Provider, No Known    Chief Complaint:    Chief Complaint   Patient presents with   • Follow-up     Hospital follow up s/p TAVR 5/16/22.       History of Present Illness:      Zack Webster is a 81 y.o. male with a history of hypertension, hyperlipidemia, diabetes, stage III CKD, BPH with chronic indwelling Joyner, mixed CHF, VHD with moderate/severe MR and moderate AS with LV dysfunction (EF 25% on periop ALVINO) s/p TAVR on 5/16/2022 with Dr. Green.  Patient presents today for postprocedure follow-up.  Since surgery, patient has been doing quite well.  He reports significant improvement in his shortness of breath and activity intolerance.  He has ongoing urologic issues due to urinary retention with chronic Joyner catheter and plans for surgery with Dr. Vivas ([facility]) next week.  Plans to follow-up with Dr. Reynoso on 6/22/2022 with TTE.    Review of Systems:  Review of Systems   Constitutional: Negative. Negative for chills, decreased appetite, fever and malaise/fatigue.   Cardiovascular: Positive for leg swelling. Negative for chest pain, claudication, dyspnea on exertion, irregular heartbeat, near-syncope, orthopnea, palpitations and syncope.   Respiratory: Positive for cough. Negative for hemoptysis, shortness of breath, sputum production and wheezing.    Hematologic/Lymphatic: Negative for bleeding problem. Bruises/bleeds easily.   Skin: Negative.  Negative for color change, poor wound healing and rash.   Musculoskeletal: Negative.  Negative for back pain, falls and joint pain.   Gastrointestinal: Positive for constipation. Negative for abdominal pain, diarrhea, nausea and vomiting.   Neurological: Negative.  Negative for focal weakness, numbness and paresthesias.   Psychiatric/Behavioral: Negative.  Negative for depression. The patient does  not have insomnia.        Allergies:  Allergies   Allergen Reactions   • Levaquin [Levofloxacin] Swelling   • Doxycycline Other (See Comments)     [facility]       Medications:      Current Outpatient Medications:   •  aspirin 81 MG EC tablet, Take 1 tablet by mouth Daily., Disp: 30 tablet, Rfl: 11  •  atorvastatin (LIPITOR) 40 MG tablet, Take 1 tablet by mouth Daily., Disp: 90 tablet, Rfl: 3  •  docusate sodium (COLACE) 100 MG capsule, Take 100 mg by mouth 2 (Two) Times a Day., Disp: , Rfl:   •  finasteride (PROSCAR) 5 MG tablet, Take 5 mg by mouth Daily., Disp: , Rfl:   •  Januvia 100 MG tablet, Take 100 mg by mouth Daily., Disp: , Rfl:   •  levothyroxine (SYNTHROID, LEVOTHROID) 75 MCG tablet, Take 75 mcg by mouth Daily., Disp: , Rfl:   •  metoprolol succinate XL (Toprol XL) 25 MG 24 hr tablet, Take 0.5 tablets by mouth Daily., Disp: 60 tablet, Rfl: 4  •  omeprazole (priLOSEC) 40 MG capsule, Take 40 mg by mouth Daily As Needed., Disp: , Rfl:   •  potassium chloride 10 MEQ CR tablet, Take 10 mEq by mouth Daily., Disp: , Rfl:   •  sacubitril-valsartan (Entresto) 49-51 MG tablet, Take 1 tablet by mouth 2 (Two) Times a Day., Disp: 180 tablet, Rfl: 3  •  tamsulosin (FLOMAX) 0.4 MG capsule 24 hr capsule, Take 1 capsule by mouth Daily., Disp: , Rfl:   •  torsemide (DEMADEX) 10 MG tablet, Take 10 mg by mouth Daily., Disp: , Rfl:     Social History     Socioeconomic History   • Marital status:    • Number of children: 1   • Years of education: High school + some college   Tobacco Use   • Smoking status: Former Smoker     Packs/day: 0.25     Years: 0.50     Pack years: 0.12     Quit date:      Years since quittin.4   • Smokeless tobacco: Never Used   Vaping Use   • Vaping Use: Never used   Substance and Sexual Activity   • Alcohol use: Not Currently   • Drug use: Never   • Sexual activity: Defer       Family History   Problem Relation Age of Onset   • Heart attack Mother    • Ulcers Father        Past Medical  History:   Diagnosis Date   • Anxiety    • aortic valve stenosis 04/08/2022   • Bleeding gastric ulcer 1969   • CHF (congestive heart failure) ([facility]) 11/2021   • Coronary artery disease 04/08/2022   • Depression    • Diabetes mellitus ([facility])    • GERD (gastroesophageal reflux disease)    • Hemorrhoid    • History of blood transfusion     Had in [place]   • Hyperlipidemia    • Hypertension    • Hypothyroidism    • Pleural effusion    • Urinary catheter in place     PT HAS LEG BAG IN PLACE       Past Surgical History:   Procedure Laterality Date   • AORTIC VALVE REPAIR/REPLACEMENT N/A 5/16/2022    Procedure: TRANSCATHETER AORTIC VALVE REPLACEMENT;  Surgeon: Percy Green MD;  Location:  [facility];  Service: Cardiothoracic;  Laterality: N/A;  FLOURO 8m  DOSE 473 mGy  CONTRAST 60ml   • AORTIC VALVE REPAIR/REPLACEMENT N/A 5/16/2022    Procedure: Transcatheter Aortic Valve Replacement;  Surgeon: Adwoa Reynoso MD;  Location:  [facility];  Service: Cardiovascular;  Laterality: N/A;   • CARDIAC CATHETERIZATION Left 04/08/2022    Procedure: Cardiac Catheterization/Vascular Study;  Surgeon: Adwoa Reynoso MD;  Location:  [facility] CATH INVASIVE LOCATION;  Service: Cardiology;  Laterality: Left;   • COLONOSCOPY     • GALLBLADDER SURGERY      had in 2000s   • TRANSESOPHAGEAL ECHOCARDIOGRAM (ALVINO) N/A 5/16/2022    Procedure: TRANSESOPHAGEAL ECHOCARDIOGRAM WITH ANESTHESIA;  Surgeon: Percy Green MD;  Location:  [facility];  Service: Cardiothoracic;  Laterality: N/A;   • UMBILICAL HERNIA REPAIR  1970       I have reviewed the following portions of the patient's history: allergies, current medications, past family history, past medical history, past social history, past surgical history and problem list and confirm it's accurate.    Physical Exam:  Vital Signs:    Vitals:    06/06/22 1243   BP: 130/74   BP Location: Right arm   Patient Position: Sitting   Pulse: 72   Temp: 97.5 °F (36.4 °C)  "  SpO2: 98%   Weight: 83 kg (183 lb)   Height: 177.8 cm (70\")     Body mass index is 26.26 kg/m².     Physical Exam  Vitals and nursing note reviewed.   Constitutional:       Appearance: Normal appearance. He is well-developed and well-groomed.   HENT:      Head: Normocephalic and atraumatic.   Cardiovascular:      Rate and Rhythm: Normal rate and regular rhythm.      Heart sounds: S1 normal and S2 normal. Murmur heard.     No friction rub.      Comments: +frequent premature beats  Pulmonary:      Comments: Unlabored, Clear to auscultation bilaterally  Genitourinary:     Comments: +gaston cath  Musculoskeletal:      Cervical back: Neck supple.      Right lower leg: No edema.      Left lower leg: No edema.   Skin:     General: Skin is warm and dry.      Comments: Bilateral groin puncture sites intact  No surrounding erythema, hematoma or induration   Neurological:      Mental Status: He is alert and oriented to person, place, and time.   Psychiatric:         Attention and Perception: Attention normal.         Mood and Affect: Mood normal.         Speech: Speech normal.         Behavior: Behavior is cooperative.         Labs/Imaging:    Chest x-ray in office today: Lungs fully expanded.  +TAVR.  No pleural effusion/PTX.  Personally reviewed.  Official radiology read pending        XR Chest 1 View    Result Date: 5/17/2022  1. No acute cardiac pulmonary process. 2. Stable cardiomegaly. 3. Status post TAVR.  This report was finalized on 5/17/2022 7:11 AM by Rico Navas MD.      XR Chest PA & Lateral    Result Date: 5/13/2022  Cardiac enlargement without evidence of acute cardiopulmonary abnormality otherwise.  This report was finalized on 5/13/2022 10:28 AM by Royal Ward.         Results for orders placed during the hospital encounter of 04/27/22    Duplex Carotid Ultrasound CAR    Interpretation Summary  · Right internal carotid artery stenosis of 0-49%.  · Left internal carotid artery stenosis of 0-49%.  · " Bilateral heterogeneous carotid atherosclerosis greater on the left than the right.      Results for orders placed during the hospital encounter of 05/16/22    Cardiac Catheterization/Vascular Study    Narrative  See dictated operative note    Assessment / Plan:  Diagnoses and all orders for this visit:    1. S/P TAVR (transcatheter aortic valve replacement) (Primary)       Zack Webster is a 81 y.o. male with a history of hypertension, hyperlipidemia, diabetes, stage III CKD, BPH with chronic indwelling Joyner, mixed CHF, VHD with moderate/severe MR and moderate AS with LV dysfunction (EF 25% on periop ALVINO) s/p TAVR on 5/16/2022 with Dr. Green.  Patient is stable from a post procedure standpoint with significant improvement in his preoperative symptoms, stable chest x-ray and bilateral groins.  Plans to follow-up with Dr. Reynoso on 6/22/2022 with TTE.  Patient is planning to undergo MRI pancreatic protocol tomorrow for preoperative TAVR CTA incidental finding of pancreatic duct dilatation and cystic appearance.  He also plans to undergo prostate surgery next week with Dr. Vivas in [place] with current Joyner catheter.  At this time, we will plan to follow-up with patient on as-needed basis    Sharon Brito [facility] Cardiothoracic Surgery

## 2022-06-07 ENCOUNTER — HOSPITAL ENCOUNTER (OUTPATIENT)
Dept: MRI IMAGING | Facility: HOSPITAL | Age: 82
Discharge: HOME | End: 2022-06-07
Admitting: NURSE PRACTITIONER

## 2022-06-07 DIAGNOSIS — K86.89 DILATION OF PANCREATIC DUCT: ICD-10-CM

## 2022-06-07 PROCEDURE — A9577 INJ MULTIHANCE: HCPCS | Performed by: NURSE PRACTITIONER

## 2022-06-07 PROCEDURE — 0 GADOBENATE DIMEGLUMINE 529 MG/ML SOLUTION: Performed by: NURSE PRACTITIONER

## 2022-06-07 PROCEDURE — 74183 MRI ABD W/O CNTR FLWD CNTR: CPT

## 2022-06-07 RX ADMIN — GADOBENATE DIMEGLUMINE 15 ML: 529 INJECTION, SOLUTION INTRAVENOUS at 16:35

## 2022-06-08 ENCOUNTER — TELEPHONE (OUTPATIENT)
Dept: CARDIOLOGY | Facility: HOSPITAL | Age: 82
End: 2022-06-08

## 2022-06-08 DIAGNOSIS — I50.42 CHRONIC COMBINED SYSTOLIC AND DIASTOLIC CONGESTIVE HEART FAILURE: ICD-10-CM

## 2022-06-08 DIAGNOSIS — I35.9 AORTIC VALVE DISORDER: Primary | ICD-10-CM

## 2022-06-08 DIAGNOSIS — K86.2 PANCREATIC CYST: ICD-10-CM

## 2022-06-08 NOTE — TELEPHONE ENCOUNTER
Telephone follow up re: MRI results for pancreatic cysts.  Advised report noted multiple cystic lesions and right adrenal adenoma.  Radiologist recommended one year follow up.  Sent referral to GI Service to set up visit and plan for monitoring schedule.    Mr. Webster states he is having prostate surgery w/ Urology @ [facility] next week.  He continues to require a gaston catheter.  However, he is happy to report he has not required a fluid pill in 4-5 days and his breathing is markedly improved.    Reminded him on next Cardiology follow up 6/22/22 for post op echo, Cardiology visit, and TAVR data collection ([facility] and walk test)    Hortensia LANDIN

## 2022-06-22 ENCOUNTER — CLINICAL SUPPORT (OUTPATIENT)
Dept: CARDIOLOGY | Facility: HOSPITAL | Age: 82
End: 2022-06-22

## 2022-06-22 ENCOUNTER — HOSPITAL ENCOUNTER (OUTPATIENT)
Dept: CARDIOLOGY | Facility: HOSPITAL | Age: 82
Discharge: HOME | End: 2022-06-22
Admitting: NURSE PRACTITIONER

## 2022-06-22 ENCOUNTER — ANTICOAGULATION VISIT (OUTPATIENT)
Dept: PHARMACY | Facility: HOSPITAL | Age: 82
End: 2022-06-22

## 2022-06-22 ENCOUNTER — OFFICE VISIT (OUTPATIENT)
Dept: CARDIOLOGY | Facility: CLINIC | Age: 82
End: 2022-06-22

## 2022-06-22 VITALS — HEIGHT: 70 IN | WEIGHT: 183 LBS | BODY MASS INDEX: 26.2 KG/M2

## 2022-06-22 VITALS
SYSTOLIC BLOOD PRESSURE: 136 MMHG | HEART RATE: 38 BPM | OXYGEN SATURATION: 99 % | HEIGHT: 70 IN | BODY MASS INDEX: 26.2 KG/M2 | WEIGHT: 183 LBS | DIASTOLIC BLOOD PRESSURE: 68 MMHG

## 2022-06-22 DIAGNOSIS — I50.22 CHRONIC SYSTOLIC CONGESTIVE HEART FAILURE: ICD-10-CM

## 2022-06-22 DIAGNOSIS — I82.90 THROMBUS: ICD-10-CM

## 2022-06-22 DIAGNOSIS — R94.31 ABNORMAL ELECTROCARDIOGRAM (ECG) (EKG): ICD-10-CM

## 2022-06-22 DIAGNOSIS — Z95.3 S/P TAVR (TRANSCATHETER AORTIC VALVE REPLACEMENT), BIOPROSTHETIC: ICD-10-CM

## 2022-06-22 DIAGNOSIS — E78.2 MIXED HYPERLIPIDEMIA: ICD-10-CM

## 2022-06-22 DIAGNOSIS — I82.90 THROMBUS: Primary | ICD-10-CM

## 2022-06-22 DIAGNOSIS — R00.1 BRADYCARDIA, SINUS: ICD-10-CM

## 2022-06-22 DIAGNOSIS — I50.42 CHRONIC COMBINED SYSTOLIC AND DIASTOLIC CONGESTIVE HEART FAILURE: ICD-10-CM

## 2022-06-22 DIAGNOSIS — I35.0 AORTIC STENOSIS, SEVERE: Primary | ICD-10-CM

## 2022-06-22 DIAGNOSIS — I35.0 AORTIC STENOSIS, SEVERE: ICD-10-CM

## 2022-06-22 DIAGNOSIS — I25.810 CORONARY ARTERY DISEASE INVOLVING CORONARY BYPASS GRAFT OF NATIVE HEART WITHOUT ANGINA PECTORIS: ICD-10-CM

## 2022-06-22 DIAGNOSIS — I10 ESSENTIAL HYPERTENSION: ICD-10-CM

## 2022-06-22 LAB
ASCENDING AORTA: 4.6 CM
BH CV ECHO MEAS - AO MAX PG: 27.3 MMHG
BH CV ECHO MEAS - AO MEAN PG: 15.7 MMHG
BH CV ECHO MEAS - AO ROOT AREA (BSA CORRECTED): 2.2 CM2
BH CV ECHO MEAS - AO ROOT DIAM: 4.4 CM
BH CV ECHO MEAS - AO V2 MAX: 261.3 CM/SEC
BH CV ECHO MEAS - AO V2 VTI: 56.1 CM
BH CV ECHO MEAS - AVA(I,D): 1.37 CM2
BH CV ECHO MEAS - EDV(CUBED): 300.8 ML
BH CV ECHO MEAS - EDV(MOD-SP2): 177 ML
BH CV ECHO MEAS - EDV(MOD-SP4): 194 ML
BH CV ECHO MEAS - EF(MOD-BP): 27 %
BH CV ECHO MEAS - EF(MOD-SP2): 29.9 %
BH CV ECHO MEAS - EF(MOD-SP4): 28.9 %
BH CV ECHO MEAS - ESV(CUBED): 157.5 ML
BH CV ECHO MEAS - ESV(MOD-SP2): 124 ML
BH CV ECHO MEAS - ESV(MOD-SP4): 138 ML
BH CV ECHO MEAS - FS: 19.4 %
BH CV ECHO MEAS - IVS/LVPW: 1 CM
BH CV ECHO MEAS - IVSD: 1.1 CM
BH CV ECHO MEAS - LA DIMENSION: 4.6 CM
BH CV ECHO MEAS - LAT PEAK E' VEL: 9.4 CM/SEC
BH CV ECHO MEAS - LV DIASTOLIC VOL/BSA (35-75): 96.5 CM2
BH CV ECHO MEAS - LV MASS(C)D: 336.9 GRAMS
BH CV ECHO MEAS - LV MAX PG: 4.2 MMHG
BH CV ECHO MEAS - LV MEAN PG: 3 MMHG
BH CV ECHO MEAS - LV SYSTOLIC VOL/BSA (12-30): 68.7 CM2
BH CV ECHO MEAS - LV V1 MAX: 102 CM/SEC
BH CV ECHO MEAS - LV V1 VTI: 24.4 CM
BH CV ECHO MEAS - LVIDD: 6.7 CM
BH CV ECHO MEAS - LVIDS: 5.4 CM
BH CV ECHO MEAS - LVOT AREA: 3.1 CM2
BH CV ECHO MEAS - LVOT DIAM: 2 CM
BH CV ECHO MEAS - LVPWD: 1.1 CM
BH CV ECHO MEAS - MED PEAK E' VEL: 5.3 CM/SEC
BH CV ECHO MEAS - MR MAX PG: 140.7 MMHG
BH CV ECHO MEAS - MR MAX VEL: 593 CM/SEC
BH CV ECHO MEAS - MR MEAN PG: 76 MMHG
BH CV ECHO MEAS - MR MEAN VEL: 399 CM/SEC
BH CV ECHO MEAS - MR VTI: 223 CM
BH CV ECHO MEAS - MV A MAX VEL: 104 CM/SEC
BH CV ECHO MEAS - MV DEC SLOPE: 278 CM/SEC2
BH CV ECHO MEAS - MV DEC TIME: 0.37 MSEC
BH CV ECHO MEAS - MV E MAX VEL: 91.2 CM/SEC
BH CV ECHO MEAS - MV E/A: 0.88
BH CV ECHO MEAS - MV MAX PG: 6.5 MMHG
BH CV ECHO MEAS - MV MEAN PG: 2 MMHG
BH CV ECHO MEAS - MV P1/2T: 130.6 MSEC
BH CV ECHO MEAS - MV V2 VTI: 49.4 CM
BH CV ECHO MEAS - MVA(P1/2T): 1.68 CM2
BH CV ECHO MEAS - MVA(VTI): 1.55 CM2
BH CV ECHO MEAS - PA ACC TIME: 0.1 SEC
BH CV ECHO MEAS - PA PR(ACCEL): 34.4 MMHG
BH CV ECHO MEAS - RAP SYSTOLE: 3 MMHG
BH CV ECHO MEAS - RF(MV,LVOT)(1DIAM): 0.9 CM
BH CV ECHO MEAS - RVSP: 46 MMHG
BH CV ECHO MEAS - SI(MOD-SP2): 26.4 ML/M2
BH CV ECHO MEAS - SI(MOD-SP4): 27.9 ML/M2
BH CV ECHO MEAS - SV(LVOT): 76.7 ML
BH CV ECHO MEAS - SV(MOD-SP2): 53 ML
BH CV ECHO MEAS - SV(MOD-SP4): 56 ML
BH CV ECHO MEAS - TAPSE (>1.6): 1.83 CM
BH CV ECHO MEAS - TR MAX PG: 42.6 MMHG
BH CV ECHO MEAS - TR MAX VEL: 325.7 CM/SEC
BH CV ECHO MEASUREMENTS AVERAGE E/E' RATIO: 12.41
BH CV VAS BP RIGHT ARM: NORMAL MMHG
BH CV XLRA - RV BASE: 3.1 CM
BH CV XLRA - RV LENGTH: 6.6 CM
BH CV XLRA - RV MID: 2.7 CM
BH CV XLRA - TDI S': 7.5 CM/SEC
INR PPP: 1.1 (ref 0.91–1.09)
LV EF 2D ECHO EST: 25 %
MAXIMAL PREDICTED HEART RATE: 138 BPM
PROTHROMBIN TIME: 12.9 SECONDS (ref 10–13.8)
STRESS TARGET HR: 117 BPM

## 2022-06-22 PROCEDURE — 93306 TTE W/DOPPLER COMPLETE: CPT | Performed by: INTERNAL MEDICINE

## 2022-06-22 PROCEDURE — 85610 PROTHROMBIN TIME: CPT

## 2022-06-22 PROCEDURE — 36416 COLLJ CAPILLARY BLOOD SPEC: CPT

## 2022-06-22 PROCEDURE — 93000 ELECTROCARDIOGRAM COMPLETE: CPT | Performed by: NURSE PRACTITIONER

## 2022-06-22 PROCEDURE — G0463 HOSPITAL OUTPT CLINIC VISIT: HCPCS

## 2022-06-22 PROCEDURE — 99214 OFFICE O/P EST MOD 30 MIN: CPT | Performed by: NURSE PRACTITIONER

## 2022-06-22 PROCEDURE — 93306 TTE W/DOPPLER COMPLETE: CPT

## 2022-06-22 RX ORDER — WARFARIN SODIUM 5 MG/1
TABLET ORAL
Qty: 30 TABLET | Refills: 0 | Status: SHIPPED | OUTPATIENT
Start: 2022-06-22 | End: 2022-08-12 | Stop reason: SDUPTHER

## 2022-06-22 NOTE — PROGRESS NOTES
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  Goal INR: 2-3  Current Drug Interactions: levothyroxine and ASA    Bleed Risk: bleeding stomach ulcers x 2 occassions 1970 and 1973.   Other: [PREVIOUS ANTICOAGULATION, ETC]    Diet: green beans and tossed salad; low amount understands consistency  Alcohol: None  Tobacco: None  OTC Pain Medication: APAP    INR History:  Date 6/22         Total Weekly Dose 0mg         INR 1.1         Notes            Phone Interview:  Tablet Strength: 5mg  Patient Contact Info: [phone] (Mobile)  Estimated OOP cost: [please send to registration if not already done]  Verbal Release Authorization - mailed to the patient 6/22/2022  Lab Contact Info:     Patient Findings  Negatives:  Signs/symptoms of thrombosis, Signs/symptoms of bleeding, Laboratory test error suspected, Change in health, Change in alcohol use, Change in activity, Upcoming invasive procedure, Emergency department visit, Upcoming dental procedure, Missed doses, Extra doses, Change in medications, Change in diet/appetite, Hospital admission, Bruising, Other complaints   Comments:  Per Dr. Reynoso's note   1. 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   2. Will repeat echo for aortic valve and EF in 3 months. Slight reduction in EF but this is difficult to confirm given frequent PVCs.   3. Continue on aspirin 81 mg for antiplatelet therapy.   4. Continue on atorvastatin 40 mg daily for hyperlipidemia.   5. Continue on metoprolol 25 mg q24h for rate control and hypertension.   6. Continue on Entresto 49-51 mg BID for hypertension.   7. Continue all other current medications.   8. F/up in 3 months, sooner if needed.       Patient reports he is not taking torsemide as of now. He  reports he will let us know if he starts back. Discussed DDI.      Welcomed Zack Webster to the [facility]. I introduced myself and discussed that we will assist with his warfarin monitoring and work with his cardiologist or other physicians to provide patient care. Encouraged patient to call the clinic with requests for warfarin refills, changes in medications / diet, change in health, or upcoming procedures / surgeries. Discussed signs and symptoms of bleeding, signs and symptoms of TIA / CVA, use of OTC pain medications, and alcohol / tobacco / dietary / other drug interactions in relation to warfarin. Explained that he will be testing his INR more frequently in these first few weeks of therapy as we try to adjust his dose and achieve a therapeutic INR x 2 consecutive readings. Once that is achieved, pt will follow up at remote lab every 4 weeks, on average. Furthermore, we discussed available dates to come to the [facility] for face to face meetings. Patient was agreeable to meeting twice per year. At this time, Zack Webster did not have further questions or concerns. Provided patient with the clinic's contact information for future reference.     Plan:  1. INR is baseline today. Instructed pt to begin warfarin 5mg once daily tomorrow. Will have 4 doses prior to INR retest on Monday.   2. Repeat INR Monday at [facility]. Sent with a standing order. Pt is aware to call the clinic following INR draw to ensure we receive the INR results the same day. He voices understanding of needing to test INR twice weekly until INR is therapeutic.   3. Pt requests warfarin refills. Sent to Pharmacy in [facility] as requested.   4. Verbal information provided over the phone to Zack Webster. Zack Webster expresses understanding by teach back, RBV dosing instructions, and has no further questions at this time.    Claritza Swann, PharmD  6/22/2022  14:36 EDT

## 2022-06-22 NOTE — PROGRESS NOTES
[facility]    Patient ID: Zack Webster is a 81 y.o. male.  : 1940   Contact: [phone]    Encounter date: 2022    PCP: Aspen Rust MD      Chief complaint:   Chief Complaint   Patient presents with   • Aortic Stenosis     severe     Problem List:  1. Aortic stenosis  a. Echo 2021, STJ: EF 35-40%, grade II diastolic dysfunction, moderate to severe MR, mild AI, moderate AS (mean PG 28.08 mmHg, peak 44.64 mmHg), moderate TR, RVSP 64.85 mmHg.   b. Cardiac cath, 2022, Noncritical LAD and RCA disease. Borderline stenosis in a moderate second diagonal branch estimated at 60% in artery barely 2 mm in diameter. 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 A a JL 5 was used for the left coronary circulation.  c. ALVINO, 2022; EF 25%. The left ventricular cavity is mildly dilated. Moderate to severe AS. Severe AS is suspected. Aortic annulus 2.7 cm. Peak velocity of the flow distal to the aortic valve is 364 cm/s. Aortic valve mean pressure gradient is 32 mmHg. Moderate to severe MR.   d. Carotid duplex, 2022; 0-49% stenosis of the CHRISTIANO and LICA. Bilateral heterogeneous carotid atherosclerosis greater on the left than the right.  e. Stress echo, 2022; EF 25%. The patient's heart rate increased from 62 bpm to only 70 bpm with the dobutamine infusion. The echocardiogram following dobutamine dosing showed a maximal velocity of 401 cm/s with a mean gradient of 40.5 mmHg. Calculated aortic valve area 0.7 cm². The stroke-volume increased from 56 to 75 mL. The aortic valve gradient remained unchanged. Based on the above criteria the patient qualifies for TAVR.  f. ALVINO, 2022; EF 25%. Global function severely impaired. Mild concentric LVH. Following placement of a 29 mm JUDI 3 pericardial prosthesis: No paravalvular leak is seen. Mean aortic valve gradient 6 mmHg. Calculated aortic valve area 1.92  cm². Moderate to severe MR. Moderate dilation of the ascending aorta.  g. TAVR, 05/16/2022; Successful transcatheter aortic valve replacement with 29-2 mm  Melgar LIBAN 3 pericardial prosthesis. No acute procedure-related complications.  h. Echocardiogram, 06/22/2022: EF 25-30%, LV mild to moderately dilated, mild LVH, 29-2 Liban 3 TAVR present with mean PG of 15.7 mmHg (6 mmHg immediately post-op), RADHA 1.37 cm2, moderate MR, mild aortic root dilation (4.4 cm, 2.2 after correcting for BSA).   2. Systolic heart failure  a. Echo 4/2021, STJ: EF 35-40%, grade II diastolic dysfunction, moderate to severe MR, mild AI, moderate AS (mean PG 28.08 mmHg, peak 44.64 mmHg), moderate TR, RVSP 64.85 mmHg.   3. Hypertension  4. Hyperlipidemia  5. Hypothyroidism  6. GERD  7. Diabetes  8. Depression    Allergies   Allergen Reactions   • Levaquin [Levofloxacin] Swelling   • Doxycycline Other (See Comments)     Uknown       Current Medications:    Current Outpatient Medications:   •  aspirin 81 MG EC tablet, Take 1 tablet by mouth Daily., Disp: 30 tablet, Rfl: 11  •  atorvastatin (LIPITOR) 40 MG tablet, Take 1 tablet by mouth Daily., Disp: 90 tablet, Rfl: 3  •  docusate sodium (COLACE) 100 MG capsule, Take 100 mg by mouth Daily., Disp: , Rfl:   •  finasteride (PROSCAR) 5 MG tablet, Take 5 mg by mouth Daily., Disp: , Rfl:   •  Januvia 100 MG tablet, Take 100 mg by mouth Daily., Disp: , Rfl:   •  levothyroxine (SYNTHROID, LEVOTHROID) 75 MCG tablet, Take 75 mcg by mouth Daily., Disp: , Rfl:   •  metoprolol succinate XL (Toprol XL) 25 MG 24 hr tablet, Take 0.5 tablets by mouth Daily., Disp: 60 tablet, Rfl: 4  •  omeprazole (priLOSEC) 40 MG capsule, Take 40 mg by mouth Daily As Needed., Disp: , Rfl:   •  potassium chloride 10 MEQ CR tablet, Take 10 mEq by mouth Daily., Disp: , Rfl:   •  sacubitril-valsartan (Entresto) 49-51 MG tablet, Take 1 tablet by mouth 2 (Two) Times a Day., Disp: 180 tablet, Rfl: 3  •  torsemide (DEMADEX) 20 MG  "tablet, Take 20 mg by mouth Daily., Disp: , Rfl:   •  tamsulosin (FLOMAX) 0.4 MG capsule 24 hr capsule, Take 1 capsule by mouth Daily., Disp: , Rfl:     HPI    Zack Webster is a 81 y.o. male who presents today for a follow up of aortic stenosis, systolic congestive heart failure, and cardiac risk factors. Since last visit, patient has felt really well. BP at home has been 110-150's/60-70's. He reports having labs recently and that his local cardiologist wanted him to decrease his Entresto and Metoprolol. Patient's echo today reveals that his mean PG across the aortic valve was 15 mmHg (6 mmHg immediately post-op).       The following portions of the patient's history were reviewed and updated as appropriate: allergies, current medications and problem list.    Pertinent positives as listed in the HPI.  All other systems reviewed are negative.         Vitals:    06/22/22 1348   BP: 136/68   BP Location: Left arm   Patient Position: Sitting   Cuff Size: Adult   Pulse: (!) 38   SpO2: 99%   Weight: 83 kg (183 lb)   Height: 177.8 cm (70\")   Osiel HR 49 bpm.     Physical Exam:  General: Alert and oriented.  Neck: Jugular venous pressure is within normal limits. Carotids have normal upstrokes without bruits.   Cardiovascular: Heart has a nondisplaced focal PMI. Irregular rhythm, estrellita rate No murmur, gallop or rub.  Lungs: Clear, no rales or wheezes. Equal expansion is noted.   Extremities: Show no edema.  Skin: Warm and dry.  Neurologic: Nonfocal.     Diagnostic Data (reviewed with patient):  Lab Results   Component Value Date    GLUCOSE 197 (H) 05/17/2022    BUN 25 (H) 05/17/2022    CREATININE 0.95 05/17/2022    BCR 26.3 (H) 05/17/2022     05/17/2022    K 4.0 05/17/2022     (H) 05/17/2022    CO2 19.0 (L) 05/17/2022    CALCIUM 7.6 (L) 05/17/2022    ALBUMIN 3.90 05/13/2022    ALKPHOS 42 05/13/2022    AST 9 05/13/2022    ALT 9 05/13/2022     Lab Results   Component Value Date    CHOL 199 04/08/2022    TRIG 63 " 04/08/2022    HDL 54 04/08/2022     (H) 04/08/2022      Lab Results   Component Value Date    WBC 7.61 05/17/2022    RBC 3.53 (L) 05/17/2022    HGB 10.9 (L) 05/17/2022    HCT 32.3 (L) 05/17/2022    MCV 91.5 05/17/2022     05/17/2022          ECG 12 Lead    Date/Time: 6/22/2022 2:15 PM  Performed by: Fatou Chacon APRN  Authorized by: Fatou Chacon APRN   Comparison: compared with previous ECG from 5/17/2022  Rhythm: sinus bradycardia  Ectopy: unifocal PVCs  BPM: 53                Assessment:    ICD-10-CM ICD-9-CM   1. Aortic stenosis, severe  I35.0 424.1   2. Chronic systolic congestive heart failure (HCC)  I50.22 428.22     428.0   3. Essential hypertension  I10 401.9   4. Mixed hyperlipidemia  E78.2 272.2         Plan:  1. 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   2. Will repeat echo for aortic valve and EF in 3 months. Slight reduction in EF but this is difficult to confirm given frequent PVCs.   3. Continue on aspirin 81 mg for antiplatelet therapy.   4. Continue on atorvastatin 40 mg daily for hyperlipidemia.   5. Continue on metoprolol 25 mg q24h for rate control and hypertension.   6. Continue on Entresto 49-51 mg BID for hypertension.  7. Continue all other current medications.  8. F/up in 3 months, sooner if needed.      Electronically signed by URVASHI Hammonds, 06/22/22, 2:12 PM EDT.

## 2022-06-22 NOTE — PROGRESS NOTES
Five Meter Walk Test    Zack Webster  1940  [id number]  06/22/22    Exclusions (check all that apply)     [] Clinically Unstable     [] Non-Ambulatory.  If checked, specify reason:     Utilized Walking Aid? No     Walk 1: 4.85 s/5m     Walk 2: 5.45 s/5m     Walk 3: 5.01 s/5m    Five Meter Walk Average: 5.10 s/5m    Gait Speed: Normal (Average < or = 6 s/5m)      Jareth Gann MA, 06/22/22        Mr. Webster' KCCQ12 score is 55/70 = NYHA class I    Follow up with Cardiology and then in TAVR clinic one year with echo.    Hortensia LANDIN

## 2022-06-27 ENCOUNTER — TELEPHONE (OUTPATIENT)
Dept: PHARMACY | Facility: HOSPITAL | Age: 82
End: 2022-06-27

## 2022-06-27 ENCOUNTER — ANTICOAGULATION VISIT (OUTPATIENT)
Dept: PHARMACY | Facility: HOSPITAL | Age: 82
End: 2022-06-27

## 2022-06-27 DIAGNOSIS — I82.90 THROMBUS: Primary | ICD-10-CM

## 2022-06-27 LAB — INR PPP: 1

## 2022-06-27 NOTE — TELEPHONE ENCOUNTER
Dr Reynoso,       This patient was supposed to start warfarin last week for presumed thrombus on aortic valve. He misunderstood the directions and has yet to begin taking, INR today 1.0.     Should he be bridged with lovenox at this time?      Thanks,  Chinyere  [facility]

## 2022-06-27 NOTE — PROGRESS NOTES
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  Goal INR: 2-3  Current Drug Interactions: levothyroxine and ASA    Bleed Risk: bleeding stomach ulcers x 2 occassions 1970 and 1973.       Diet: green beans and tossed salad; low amount understands consistency  Alcohol: None  Tobacco: None  OTC Pain Medication: APAP    INR History:  Date 6/22         Total Weekly Dose 0mg         INR 1.1         Notes            Phone Interview:  Tablet Strength: 5mg  Patient Contact Info: [phone] (Mobile)  Estimated OOP cost: [please send to registration if not already done]  Verbal Release Authorization - mailed to the patient 6/22/2022  Lab Contact Info:     Patient Findings    Positives:  Missed doses   Negatives:  Signs/symptoms of thrombosis, Signs/symptoms of bleeding, Laboratory test error suspected, Change in health, Change in alcohol use, Change in activity, Upcoming invasive procedure, Emergency department visit, Upcoming dental procedure, Extra doses, Change in medications, Change in diet/appetite, Hospital admission, Bruising, Other complaints   Comments:  Patient misunderstood directions and did not begin taking yet     Plan:  1. INR is remains baseline today, patient misunderstood and did not begin taking warfarin. Instructed pt to BOOST dose to 7.5mg today then take warfarin 5mg once daily  2. Repeat INR Monday at [facility]. Sent with a standing order. Pt is aware to call the clinic following INR draw to ensure we receive the INR results the same day. He voices understanding of needing to test INR twice weekly until INR is therapeutic.   3. Verbal information provided over the phone to Zack Webster. Zack Webster expresses understanding by teach back, RBV dosing instructions, and has no  further questions at this time.  4. Verified with Dr Reynoso NO lovenox bridge needed at this time    Chinyere Dunn, JaniceD.  06/27/22   16:42 EDT

## 2022-06-30 ENCOUNTER — ANTICOAGULATION VISIT (OUTPATIENT)
Dept: PHARMACY | Facility: HOSPITAL | Age: 82
End: 2022-06-30

## 2022-06-30 DIAGNOSIS — I82.90 THROMBUS: Primary | ICD-10-CM

## 2022-06-30 LAB — INR PPP: 1.2

## 2022-06-30 NOTE — PROGRESS NOTES
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  Goal INR: 2-3  Current Drug Interactions: levothyroxine and ASA    Bleed Risk: bleeding stomach ulcers x 2 occassions 1970 and 1973.     Diet: green beans and tossed salad; low amount understands consistency  Alcohol: None  Tobacco: None  OTC Pain Medication: APAP    INR History:  Date 6/22 6/27 6/30       Total Weekly Dose 0mg 0mg 17.5mg       INR 1.1 1.0 1.2       Notes            Phone Interview:  Tablet Strength: 5mg  Patient Contact Info: [phone] (Mobile)  Estimated OOP cost: [please send to registration if not already done]  Verbal Release Authorization - mailed to the patient 6/22/2022  Lab Contact Info: [facility]    Patient Findings  Negatives:  Signs/symptoms of thrombosis, Signs/symptoms of bleeding, Laboratory test error suspected, Change in health, Change in alcohol use, Change in activity, Upcoming invasive procedure, Emergency department visit, Upcoming dental procedure, Missed doses, Extra doses, Change in medications, Change in diet/appetite, Hospital admission, Bruising, Other complaints   Comments:  He reports he saw urologist this morning and some bleeding in urine is present occasionally following prostate procedure.     Plan:  1. INR is baseline as started on Monday. Instructed pt to continue 5mg daily until repeat.  2. Repeat INR Tuesday 7/5 at [facility].   3. Verbal information provided over the phone to Zack Webster. Zack Webster expresses understanding by teach back, RBV dosing instructions, and has no further questions at this time.  4. Verified with Dr Reynoso NO lovenox bridge needed at this time    Claritza Swann, PharmD  06/30/22   15:10 EDT

## 2022-07-05 ENCOUNTER — ANTICOAGULATION VISIT (OUTPATIENT)
Dept: PHARMACY | Facility: HOSPITAL | Age: 82
End: 2022-07-05

## 2022-07-05 DIAGNOSIS — I82.90 THROMBUS: Primary | ICD-10-CM

## 2022-07-05 LAB — INR PPP: 1.5

## 2022-07-05 NOTE — PROGRESS NOTES
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  Goal INR: 2-3  Current Drug Interactions: levothyroxine and ASA    Bleed Risk: bleeding stomach ulcers x 2 occassions 1970 and 1973.     Diet: green beans and tossed salad; low amount understands consistency  Alcohol: None  Tobacco: None  OTC Pain Medication: APAP    INR History:  Date 6/22 6/27 6/30 7/5      Total Weekly Dose 0mg 0mg 17.5mg 35mg      INR 1.1 1.0 1.2 1.5      Notes            Phone Interview:  Tablet Strength: 5mg  Patient Contact Info: [phone] (Mobile)  Estimated OOP cost: [please send to registration if not already done]  Verbal Release Authorization - mailed to the patient 6/22/2022  Lab Contact Info: [facility]    Patient Findings  Negatives:  Signs/symptoms of thrombosis, Signs/symptoms of bleeding, Laboratory test error suspected, Change in health, Change in alcohol use, Change in activity, Upcoming invasive procedure, Emergency department visit, Upcoming dental procedure, Missed doses, Extra doses, Change in medications, Change in diet/appetite, Hospital admission, Bruising, Other complaints   Comments:  He reports he saw urologist this morning and some bleeding in urine is present occasionally following prostate procedure.      Plan:  1. INR is SUBtherapeutic at 1.5. Instructed pt to boost dose tonight 7.5mg then continue 5mg daily until repeat.  2. Repeat INR Tuesday 7/8 at [facility].   3. Verbal information provided over the phone to Zack Webster. Zack Webster expresses understanding by teach back, RBV dosing instructions, and has no further questions at this time.  4. Verified with Dr Reynoso NO lovenox bridge needed at this time    Claritza Swann, PharmD  07/05/22   14:18 EDT

## 2022-07-08 ENCOUNTER — ANTICOAGULATION VISIT (OUTPATIENT)
Dept: PHARMACY | Facility: HOSPITAL | Age: 82
End: 2022-07-08

## 2022-07-08 DIAGNOSIS — I82.90 THROMBUS: Primary | ICD-10-CM

## 2022-07-08 LAB — INR PPP: 1.7

## 2022-07-08 NOTE — PROGRESS NOTES
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  Goal INR: 2-3  Current Drug Interactions: levothyroxine and ASA    Bleed Risk: bleeding stomach ulcers x 2 occassions 1970 and 1973.     Diet: green beans and tossed salad; low amount understands consistency  Alcohol: None  Tobacco: None  OTC Pain Medication: APAP    INR History:  Date 6/22 6/27 6/30 7/5 7/8     Total Weekly Dose 0mg 0mg 17.5mg 35mg 37.5mg     INR 1.1 1.0 1.2 1.5 1.7     Notes            Phone Interview:  Tablet Strength: 5mg  Patient Contact Info: [phone] (Mobile)  Estimated OOP cost: [please send to registration if not already done]  Verbal Release Authorization - mailed to the patient 6/22/2022  Lab Contact Info: [facility]    Patient Findings  Negatives:  Signs/symptoms of thrombosis, Signs/symptoms of bleeding, Laboratory test error suspected, Change in health, Change in alcohol use, Change in activity, Upcoming invasive procedure, Emergency department visit, Upcoming dental procedure, Missed doses, Extra doses, Change in medications, Change in diet/appetite, Hospital admission, Bruising, Other complaints   Comments:  No hematuria for last three days.      Plan:  1. INR is SUBtherapeutic at 1.7. Instructed pt to increase dose to 5mg daily 7.5mg TueFriSun.  2. Repeat INR Tuesday 7/12 at [facility] to ensure 3 days of 7.5mg is appropriate.   3. Verbal information provided over the phone to Zack Webster. Zack Webster expresses understanding by teach back, RBV dosing instructions, and has no further questions at this time.  4. Verified with Dr Reynoso NO lovenox bridge needed at this time    Claritza Swann, PharmD  07/08/22   15:35 EDT

## 2022-07-11 ENCOUNTER — ANTICOAGULATION VISIT (OUTPATIENT)
Dept: PHARMACY | Facility: HOSPITAL | Age: 82
End: 2022-07-11

## 2022-07-11 DIAGNOSIS — I82.90 THROMBUS: Primary | ICD-10-CM

## 2022-07-11 LAB — INR PPP: 1.7

## 2022-07-12 ENCOUNTER — ANTICOAGULATION VISIT (OUTPATIENT)
Dept: PHARMACY | Facility: HOSPITAL | Age: 82
End: 2022-07-12

## 2022-07-12 DIAGNOSIS — I82.90 THROMBUS: Primary | ICD-10-CM

## 2022-07-12 LAB — INR PPP: 2

## 2022-07-12 NOTE — PROGRESS NOTES
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  Goal INR: 2-3  Current Drug Interactions: levothyroxine and ASA    Bleed Risk: bleeding stomach ulcers x 2 occassions 1970 and 1973.     Diet: green beans and tossed salad; low amount understands consistency  Alcohol: None  Tobacco: None  OTC Pain Medication: APAP    INR History:  Date 6/22 6/27 6/30 7/5 7/8 7/12    Total Weekly Dose 0mg 0mg 17.5mg 35mg 37.5mg 42.5mg    INR 1.1 1.0 1.2 1.5 1.7 2.0    Notes            Phone Interview:  Tablet Strength: 5mg  Patient Contact Info: [phone] (Mobile)  Estimated OOP cost: [please send to registration if not already done]  Verbal Release Authorization - mailed to the patient 6/22/2022  Lab Contact Info: [facility]    Patient Findings  Positives:  Signs/symptoms of bleeding   Negatives:  Signs/symptoms of thrombosis, Laboratory test error suspected, Change in health, Change in alcohol use, Change in activity, Upcoming invasive procedure, Emergency department visit, Upcoming dental procedure, Missed doses, Extra doses, Change in medications, Change in diet/appetite, Hospital admission, Bruising, Other complaints   Comments:  Sunday had some blood in the urine after a long drive, patient states that he told his physician who attributed it to his recent prostate surgery. Counseled patient to continue to monitor for signs and symptoms of bleeding and to contact the [facility] clinic or his physician if he notices any more.     Plan:  1. INR is therapeutic at 2.0. Called patient and instructed to take 7.5mg on 7/12 and then continue maintenance dose of 5mg daily except 7.5mg TueFriSun until recheck.  2. Repeat INR 7/15.  3. Verbal information provided over the phone to Zack Webster.  Zack Webster expresses understanding by teach back, RBV dosing instructions, and has no further questions at this time.    Thank you,  Santy Olivares, PharmD  Pharmacy Resident   7/12/2022  16:33 EDT    If INR on 7/15 is WNL, can consider one week follow up.  I, Claritza Swann, PharmD, have reviewed the note in full and agree with the assessment and plan.  07/13/22  10:42 EDT

## 2022-07-13 ENCOUNTER — TELEPHONE (OUTPATIENT)
Dept: PHARMACY | Facility: HOSPITAL | Age: 82
End: 2022-07-13

## 2022-07-13 RX ORDER — WARFARIN SODIUM 5 MG/1
TABLET ORAL
Qty: 40 TABLET | Refills: 0 | Status: SHIPPED | OUTPATIENT
Start: 2022-07-13

## 2022-07-15 ENCOUNTER — ANTICOAGULATION VISIT (OUTPATIENT)
Dept: PHARMACY | Facility: HOSPITAL | Age: 82
End: 2022-07-15

## 2022-07-15 DIAGNOSIS — I82.90 THROMBUS: Primary | ICD-10-CM

## 2022-07-15 LAB — INR PPP: 2.1

## 2022-07-15 NOTE — PROGRESS NOTES
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  Goal INR: 2-3  Current Drug Interactions: levothyroxine and ASA    Bleed Risk: bleeding stomach ulcers x 2 occassions 1970 and 1973.     Diet: green beans and tossed salad; low amount understands consistency  Alcohol: None  Tobacco: None  OTC Pain Medication: APAP    INR History:  Date 6/22 6/27 6/30 7/5 7/8 7/12 7/15        Total Weekly Dose 0mg 0mg 17.5mg 35mg 37.5mg 42.5mg 42.5 mg        INR 1.1 1.0 1.2 1.5 1.7 2.0 2.1        Notes                 Phone Interview:  Tablet Strength: 5mg  Patient Contact Info: [phone] (Mobile)  Estimated OOP cost: [please send to registration if not already done]  Verbal Release Authorization - mailed to the patient 6/22/2022  Lab Contact Info: [facility]    Patient Findings    Negatives:  Signs/symptoms of thrombosis, Signs/symptoms of bleeding, Laboratory test error suspected, Change in health, Change in alcohol use, Change in activity, Upcoming invasive procedure, Emergency department visit, Upcoming dental procedure, Missed doses, Extra doses, Change in medications, Change in diet/appetite, Hospital admission, Bruising, Other complaints   Comments:  All findings negative per pt.      Plan:  1. INR is therapeutic at 2.1. Instructed Mr Webster to continue maintenance dose of 5mg daily except 7.5mg TueFriSun until recheck.  2. Repeat INR in one week,  7/22/22..  3. Verbal information provided over the phone to Zack Webster. Zack Webster expresses understanding by teach back, RBV dosing instructions, and has no further questions at this time.    Erick Drew CPhT  7/15/2022  13:36 EDT     I, Rodney Jones, PharmD, have reviewed the note in full and agree with the assessment and  plan.  07/15/22  14:40 EDT

## 2022-07-22 ENCOUNTER — ANTICOAGULATION VISIT (OUTPATIENT)
Dept: PHARMACY | Facility: HOSPITAL | Age: 82
End: 2022-07-22

## 2022-07-22 DIAGNOSIS — I82.90 THROMBUS: Primary | ICD-10-CM

## 2022-07-22 LAB — INR PPP: 1.9

## 2022-07-22 NOTE — PROGRESS NOTES
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  Goal INR: 2-3  Current Drug Interactions: levothyroxine and ASA    Bleed Risk: bleeding stomach ulcers x 2 occassions 1970 and 1973.     Diet: green beans and tossed salad; low amount understands consistency  Alcohol: None  Tobacco: None  OTC Pain Medication: APAP    INR History:  Date 6/22 6/27 6/30 7/5 7/8 7/12 7/15 7/22       Total Weekly Dose 0mg 0mg 17.5mg 35mg 37.5mg 42.5mg 42.5 mg 42.5 mg       INR 1.1 1.0 1.2 1.5 1.7 2.0 2.1 1.9       Notes                 Phone Interview:  Tablet Strength: 5mg  Patient Contact Info: [phone] (Mobile) may speak to Skip garcia (son)  Estimated OOP cost: [please send to registration if not already done]  Verbal Release Authorization - Received 7/28/22  Lab Contact Info: [facility]    Patient Findings    Positives:  Signs/symptoms of bleeding   Negatives:  Signs/symptoms of thrombosis, Laboratory test error suspected, Change in health, Change in alcohol use, Change in activity, Upcoming invasive procedure, Emergency department visit, Upcoming dental procedure, Missed doses, Extra doses, Change in medications, Change in diet/appetite, Hospital admission, Bruising, Other complaints   Comments:  Excessive bleeding from small cut on arm, stopped within the hour. All other findings negative.      Plan:  1. INR is SUBtherapeutic today at 1.9. Per Claritza Swann, PharmD, instructed Mr Garcai to take warfarin 7.5 mg every day except warfarin 5 mg SunTueThur until recheck.  2. Repeat INR in one week,  7/29/22..  3. Verbal information provided over the phone to Zack Garcia. Zack Garcia expresses understanding by teach back, RBV dosing instructions, and has no further questions at this  time.    Erick Drew CPhT  7/22/2022  12:54 EDT     I, Trina Waldrop, PharmD, have reviewed the note in full and agree with the assessment and plan.  07/22/22  14:00 EDT

## 2022-07-29 ENCOUNTER — ANTICOAGULATION VISIT (OUTPATIENT)
Dept: PHARMACY | Facility: HOSPITAL | Age: 82
End: 2022-07-29

## 2022-07-29 DIAGNOSIS — I82.90 THROMBUS: Primary | ICD-10-CM

## 2022-07-29 LAB — INR PPP: 2.1

## 2022-07-29 NOTE — PROGRESS NOTES
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  Goal INR: 2-3  Current Drug Interactions: levothyroxine and ASA    Bleed Risk: bleeding stomach ulcers x 2 occassions 1970 and 1973.     Diet: green beans and tossed salad; low amount understands consistency  Alcohol: None  Tobacco: None  OTC Pain Medication: APAP    INR History:  Date 6/22 6/27 6/30 7/5 7/8 7/12 7/15 7/22 7/29      Total Weekly Dose 0mg 0mg 17.5mg 35mg 37.5mg 42.5mg 42.5 mg 42.5 mg 45 mg      INR 1.1 1.0 1.2 1.5 1.7 2.0 2.1 1.9 2.1      Notes                 Phone Interview:  Tablet Strength: 5mg  Patient Contact Info: [phone] (Mobile) may speak to Skip garcia (son)  Estimated OOP cost: [please send to registration if not already done]  Verbal Release Authorization - Received 7/28/22  Lab Contact Info: [facility]    Patient Findings    Negatives:  Signs/symptoms of thrombosis, Signs/symptoms of bleeding, Laboratory test error suspected, Change in health, Change in alcohol use, Change in activity, Upcoming invasive procedure, Emergency department visit, Upcoming dental procedure, Missed doses, Extra doses, Change in medications, Change in diet/appetite, Hospital admission, Bruising, Other complaints   Comments:  All findings negative per pt.      Plan:  1. INR is therapeutic today at 2.1(goal 2.0-3.0). Instructed Mr Garcia to continue warfarin 7.5 mg every day except warfarin 5 mg SunTueThur until recheck.  2. Repeat INR in one week,  8/5/22..  3. Verbal information provided over the phone to Zack Garcia. Zack Garcia expresses understanding by teach back, RBV dosing instructions, and has no further questions at this time.    Erick Drew CPhT  7/29/2022  13:24 EDT     I, Trina Waldrop, PharmD, have  reviewed the note in full and agree with the assessment and plan.  07/29/22  14:43 EDT

## 2022-08-08 ENCOUNTER — ANTICOAGULATION VISIT (OUTPATIENT)
Dept: PHARMACY | Facility: HOSPITAL | Age: 82
End: 2022-08-08

## 2022-08-08 DIAGNOSIS — I82.90 THROMBUS: Primary | ICD-10-CM

## 2022-08-08 LAB — INR PPP: 2.2

## 2022-08-08 NOTE — PROGRESS NOTES
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  Goal INR: 2-3  Current Drug Interactions: levothyroxine and ASA    Bleed Risk: bleeding stomach ulcers x 2 occassions 1970 and 1973.     Diet: green beans and tossed salad; low amount understands consistency  Alcohol: None  Tobacco: None  OTC Pain Medication: APAP    INR History:  Date 6/22 6/27 6/30 7/5 7/8 7/12 7/15 7/22 7/29 8/5     Total Weekly Dose 0mg 0mg 17.5mg 35mg 37.5mg 42.5mg 42.5 mg 42.5 mg 45 mg 45 mg     INR 1.1 1.0 1.2 1.5 1.7 2.0 2.1 1.9 2.1 2.2     Notes          rec 8/8       Phone Interview:  Tablet Strength: 5mg  Patient Contact Info: [phone] (Mobile) may speak to Skip garcia (son)  Estimated OOP cost: [please send to registration if not already done]  Verbal Release Authorization - Received 7/28/22  Lab Contact Info: [facility]    Patient Findings  Negatives:  Signs/symptoms of thrombosis, Signs/symptoms of bleeding, Laboratory test error suspected, Change in health, Change in alcohol use, Change in activity, Upcoming invasive procedure, Emergency department visit, Upcoming dental procedure, Missed doses, Extra doses, Change in medications, Change in diet/appetite, Hospital admission, Bruising, Other complaints   Comments:  All findings negative per pt.      Plan:  1. INR was therapeutic 8/5 at 2.2 (goal 2.0-3.0), received results today. Instructed Mr Garcia to continue warfarin 7.5 mg every day except warfarin 5 mg SunTueThur until recheck.  2. Repeat INR in one week, 8/12/22.  3. Verbal information provided over the phone to Zack Garcia. Zack Garcia expresses understanding by teach back, RBV dosing instructions, and has no further questions at this time.    Erick Drew CPhT  8/8/2022  11:48 EDT      If therapeutic again at follow up, may push next INR two weeks out.   ILaly, PharmD, have reviewed the note in full and agree with the assessment and plan.  08/08/22  14:53 EDT

## 2022-08-12 ENCOUNTER — ANTICOAGULATION VISIT (OUTPATIENT)
Dept: PHARMACY | Facility: HOSPITAL | Age: 82
End: 2022-08-12

## 2022-08-12 DIAGNOSIS — I82.90 THROMBUS: Primary | ICD-10-CM

## 2022-08-12 LAB — INR PPP: 2.4

## 2022-08-12 RX ORDER — WARFARIN SODIUM 5 MG/1
TABLET ORAL
Qty: 100 TABLET | Refills: 0 | Status: SHIPPED | OUTPATIENT
Start: 2022-08-12 | End: 2022-09-30 | Stop reason: SDUPTHER

## 2022-08-12 NOTE — PROGRESS NOTES
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  Goal INR: 2-3  Current Drug Interactions: levothyroxine and ASA    Bleed Risk: bleeding stomach ulcers x 2 occassions 1970 and 1973.     Diet: green beans and tossed salad; low amount understands consistency  Alcohol: None  Tobacco: None  OTC Pain Medication: APAP    INR History:  Date 6/22 6/27 6/30 7/5 7/8 7/12 7/15 7/22 7/29 8/5 8/12    Total Weekly Dose 0mg 0mg 17.5mg 35mg 37.5mg 42.5mg 42.5 mg 42.5 mg 45 mg 45 mg 45 mg    INR 1.1 1.0 1.2 1.5 1.7 2.0 2.1 1.9 2.1 2.2 2.4    Notes          rec 8/8       Phone Interview:  Tablet Strength: 5mg  Patient Contact Info: [phone] (Mobile) may speak to Skip garcia (son)  Estimated OOP cost: [please send to registration if not already done]  Verbal Release Authorization - Received 7/28/22  Lab Contact Info: [facility]    Patient Findings  Negatives:  Signs/symptoms of thrombosis, Signs/symptoms of bleeding, Laboratory test error suspected, Change in health, Change in alcohol use, Change in activity, Upcoming invasive procedure, Emergency department visit, Upcoming dental procedure, Missed doses, Extra doses, Change in medications, Change in diet/appetite, Hospital admission, Bruising, Other complaints   Comments:  All findings negative per pt.      Plan:  1. INR is therapeutic today at 2.4 (goal 2.0-3.0) Instructed Mr Garcia to continue warfarin 7.5 mg every day except warfarin 5 mg SunTueThur until recheck.  2. Repeat INR in one week, 8/26/22.  3. Pt will need refills sent in.  3. Verbal information provided over the phone to Zack Garcia. Zack Garcia expresses understanding by teach back, RBV dosing instructions, and has no further questions at this time.    Erick Drew,  CP  8/12/2022  12:23 EDT       I, Claritza Swann, PharmD, have reviewed the note in full and agree with the assessment and plan.  08/12/22  14:05 EDT

## 2022-08-26 ENCOUNTER — ANTICOAGULATION VISIT (OUTPATIENT)
Dept: PHARMACY | Facility: HOSPITAL | Age: 82
End: 2022-08-26

## 2022-08-26 DIAGNOSIS — I82.90 THROMBUS: Primary | ICD-10-CM

## 2022-08-26 LAB — INR PPP: 2.6

## 2022-08-26 NOTE — PROGRESS NOTES
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  Goal INR: 2-3  Current Drug Interactions: levothyroxine and ASA    Bleed Risk: bleeding stomach ulcers x 2 occassions 1970 and 1973.     Diet: green beans and tossed salad; low amount understands consistency  Alcohol: None  Tobacco: None  OTC Pain Medication: APAP    INR History:  Date 6/22 6/27 6/30 7/5 7/8 7/12 7/15 7/22 7/29 8/5 8/12 8/26   Total Weekly Dose 0mg 0mg 17.5mg 35mg 37.5mg 42.5mg 42.5 mg 42.5 mg 45 mg 45 mg 45 mg 45 mg   INR 1.1 1.0 1.2 1.5 1.7 2.0 2.1 1.9 2.1 2.2 2.4 2.6   Notes          rec 8/8       Date               Total Weekly Dose               INR               Notes                 Phone Interview:  Tablet Strength: 5mg  Patient Contact Info: [phone] (Mobile) may speak to Skip garcia (son)  Estimated OOP cost: [please send to registration if not already done]  Verbal Release Authorization - Received 7/28/22  Lab Contact Info: [facility]    Patient Findings    Positives:  Change in health, Change in medications, Change in diet/appetite   Negatives:  Signs/symptoms of thrombosis, Signs/symptoms of bleeding, Laboratory test error suspected, Change in alcohol use, Change in activity, Upcoming invasive procedure, Emergency department visit, Upcoming dental procedure, Missed doses, Extra doses, Hospital admission, Bruising, Other complaints   Comments:  Pt has been taking pantoprazole and sucralfate for possible ulcers; as well as valacyclovir for possible shingles. Appetite has decreased d/t possible ulcers.     Plan:  1. INR is therapeutic today at 2.6 (goal 2.0-3.0) Instructed Mr Garcia to continue warfarin 7.5 mg every day except warfarin 5 mg SunTueThur until recheck.  2. Repeat INR in one week,  9/2/222.  3. Verbal information provided over the phone to Zack Webster. Zack Webster expresses understanding by teach back, RBV dosing instructions, and has no further questions at this time.    Erick Drew CPhT  8/26/2022  14:24 EDT     I, Rafi Solis, PharmD, have reviewed the note in full and agree with the assessment and plan. New DDIs noted. Sucralfate can decrease effects of warfarin, pantoprazole can increase effects of warfarin. Will continue current dose for now, planned retest in one week.  08/26/22  14:45 EDT

## 2022-09-02 ENCOUNTER — ANTICOAGULATION VISIT (OUTPATIENT)
Dept: PHARMACY | Facility: HOSPITAL | Age: 82
End: 2022-09-02

## 2022-09-02 DIAGNOSIS — I82.90 THROMBUS: Primary | ICD-10-CM

## 2022-09-02 LAB — INR PPP: 2

## 2022-09-02 NOTE — PROGRESS NOTES
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  Goal INR: 2-3  Current Drug Interactions: levothyroxine and ASA    Bleed Risk: bleeding stomach ulcers x 2 occassions 1970 and 1973.     Diet: green beans and tossed salad; low amount understands consistency  Alcohol: None  Tobacco: None  OTC Pain Medication: APAP    INR History:  Date 6/22 6/27 6/30 7/5 7/8 7/12 7/15 7/22 7/29 8/5 8/12 8/26   Total Weekly Dose 0mg 0mg 17.5mg 35mg 37.5mg 42.5mg 42.5 mg 42.5 mg 45 mg 45 mg 45 mg 45 mg   INR 1.1 1.0 1.2 1.5 1.7 2.0 2.1 1.9 2.1 2.2 2.4 2.6   Notes          rec 8/8       Date 9/2              Total Weekly Dose 45 mg              INR 2.0              Notes                   Phone Interview:  Tablet Strength: 5mg  Patient Contact Info: [phone] (home- preferred)  back up phone [phone] (mobile) may speak to Skip garcia (son)  Estimated OOP cost: [please send to registration if not already done]  Verbal Release Authorization - Received 7/28/22  Lab Contact Info: [facility]    Patient Findings  Positives:  Change in medications   Negatives:  Signs/symptoms of thrombosis, Signs/symptoms of bleeding, Laboratory test error suspected, Change in health, Change in alcohol use, Change in activity, Upcoming invasive procedure, Emergency department visit, Upcoming dental procedure, Missed doses, Extra doses, Change in diet/appetite, Hospital admission, Bruising, Other complaints   Comments:  Skip Garcia mentioned his father has finished taking pantoprazole and sucralfate for possible ulcers; as well as valacyclovir for possible shingles.      Plan:  1. INR is therapeutic today at 2.0 (goal 2.0-3.0) Instructed Mr Garcia to continue warfarin 7.5 mg every day except warfarin 5 mg  Katy until recheck.  2. Repeat INR in one week, 9/9/222.  3. Verbal information provided over the phone to Zack Webster. Zack Webster expresses understanding by teach back, RBV dosing instructions, and has no further questions at this time.    Nick Mccauley, Pharmacy Technician  9/2/2022  15:12 EDT    I, Chinyere Dunn, PharmD, have reviewed the note in full and agree with the assessment and plan.  09/02/22  16:30 EDT

## 2022-09-09 ENCOUNTER — ANTICOAGULATION VISIT (OUTPATIENT)
Dept: PHARMACY | Facility: HOSPITAL | Age: 82
End: 2022-09-09

## 2022-09-09 DIAGNOSIS — I82.90 THROMBUS: Primary | ICD-10-CM

## 2022-09-09 LAB — INR PPP: 1.8

## 2022-09-09 NOTE — PROGRESS NOTES
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  Goal INR: 2-3  Current Drug Interactions: levothyroxine and ASA    Bleed Risk: bleeding stomach ulcers x 2 occassions 1970 and 1973.     Diet: green beans and tossed salad; low amount understands consistency  Alcohol: None  Tobacco: None  OTC Pain Medication: APAP    INR History:  Date 6/22 6/27 6/30 7/5 7/8 7/12 7/15 7/22 7/29 8/5 8/12 8/26   Total Weekly Dose 0mg 0mg 17.5mg 35mg 37.5mg 42.5mg 42.5 mg 42.5 mg 45 mg 45 mg 45 mg 45 mg   INR 1.1 1.0 1.2 1.5 1.7 2.0 2.1 1.9 2.1 2.2 2.4 2.6   Notes          rec 8/8       Date 9/2 9/9             Total Weekly Dose 45 mg 45 mg             INR 2.0 1.8             Notes                   Phone Interview:  Tablet Strength: 5mg  Patient Contact Info: [phone] (home- preferred)  back up phone [phone] (mobile) may speak to Skip garcia (son)  Estimated OOP cost: [please send to registration if not already done]  Verbal Release Authorization - Received 7/28/22  Lab Contact Info: [facility]    Patient Findings    Negatives:  Signs/symptoms of thrombosis, Signs/symptoms of bleeding, Laboratory test error suspected, Change in health, Change in alcohol use, Change in activity, Upcoming invasive procedure, Emergency department visit, Upcoming dental procedure, Missed doses, Extra doses, Change in medications, Change in diet/appetite, Hospital admission, Bruising, Other complaints   Comments:  All findings negative per pt.      Plan:  1. INR is SUBtherapeutic today at 1.8 (goal 2.0-3.0) Per Chinyere Dunn, PharmD, instructed Mr Garcia to boost tonights warfarin dose to 10 mg then continue warfarin 7.5 mg every day except warfarin 5 mg SunTueThur until recheck.  2. Repeat INR in  one week, 9/16/222.  3. Verbal information provided over the phone to Zack Webster. Zack Webster expresses understanding by teach back, RBV dosing instructions, and has no further questions at this time.    Erick Drew CPhT  9/9/2022  14:58 EDT       Can extend interval if WNL  I, Chinyere Dunn, PharmD, have reviewed the note in full and agree with the assessment and plan.  09/09/22  15:15 EDT

## 2022-09-16 ENCOUNTER — ANTICOAGULATION VISIT (OUTPATIENT)
Dept: PHARMACY | Facility: HOSPITAL | Age: 82
End: 2022-09-16

## 2022-09-16 DIAGNOSIS — I82.90 THROMBUS: Primary | ICD-10-CM

## 2022-09-16 LAB — INR PPP: 2.3

## 2022-09-16 NOTE — PROGRESS NOTES
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  Goal INR: 2-3  Current Drug Interactions: levothyroxine and ASA    Bleed Risk: bleeding stomach ulcers x 2 occassions 1970 and 1973.     Diet: green beans and tossed salad; low amount understands consistency  Alcohol: None  Tobacco: None  OTC Pain Medication: APAP    INR History:  Date 6/22 6/27 6/30 7/5 7/8 7/12 7/15 7/22 7/29 8/5 8/12 8/26   Total Weekly Dose 0mg 0mg 17.5mg 35mg 37.5mg 42.5mg 42.5 mg 42.5 mg 45 mg 45 mg 45 mg 45 mg   INR 1.1 1.0 1.2 1.5 1.7 2.0 2.1 1.9 2.1 2.2 2.4 2.6   Notes          rec 8/8       Date 9/2 9/9 9/16            Total Weekly Dose 45 mg 45 mg 47.5 mg            INR 2.0 1.8 2.3            Notes   1 boost                Phone Interview:  Tablet Strength: 5mg  Patient Contact Info: [phone] (home- preferred)  back up phone [phone] (mobile) may speak to Skip garcia (son)  Estimated OOP cost: [please send to registration if not already done]  Verbal Release Authorization - Received 7/28/22  Lab Contact Info: [facility]    Patient Findings    Negatives:  Signs/symptoms of thrombosis, Signs/symptoms of bleeding, Laboratory test error suspected, Change in health, Change in alcohol use, Change in activity, Upcoming invasive procedure, Emergency department visit, Upcoming dental procedure, Missed doses, Extra doses, Change in medications, Change in diet/appetite, Hospital admission, Bruising, Other complaints   Comments:  All findings negative per pt.      Plan:  1. INR is therapeutic today at 2.3 (goal 2.0-3.0) instructed Mr Garcia to continue warfarin 7.5 mg every day except warfarin 5 mg SunTueThur until recheck.  2. Repeat INR in one week, 9/23/222. If in goal range, may extend  recheck.  3. Verbal information provided over the phone to Zack Webster. Zack Webster expresses understanding by teach back, RBV dosing instructions, and has no further questions at this time.    Nick Mccauley, Pharmacy Technician  9/16/2022  13:42 EDT    I, Rodney Jones, PharmD, have reviewed the note in full and agree with the assessment and plan.  09/16/22  14:06 EDT

## 2022-09-23 ENCOUNTER — ANTICOAGULATION VISIT (OUTPATIENT)
Dept: PHARMACY | Facility: HOSPITAL | Age: 82
End: 2022-09-23

## 2022-09-23 DIAGNOSIS — I82.90 THROMBUS: Primary | ICD-10-CM

## 2022-09-23 LAB — INR PPP: 1.7

## 2022-09-23 NOTE — PROGRESS NOTES
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  Goal INR: 2-3  Current Drug Interactions: levothyroxine and ASA    Bleed Risk: bleeding stomach ulcers x 2 occassions 1970 and 1973.     Diet: green beans and tossed salad; low amount understands consistency  Alcohol: None  Tobacco: None  OTC Pain Medication: APAP    INR History:  Date 6/22 6/27 6/30 7/5 7/8 7/12 7/15 7/22 7/29 8/5 8/12 8/26   Total Weekly Dose 0mg 0mg 17.5mg 35mg 37.5mg 42.5mg 42.5 mg 42.5 mg 45 mg 45 mg 45 mg 45 mg   INR 1.1 1.0 1.2 1.5 1.7 2.0 2.1 1.9 2.1 2.2 2.4 2.6   Notes          rec 8/8       Date 9/2 9/9 9/16 9/23           Total Weekly Dose 45 mg 45 mg 47.5 mg 45 mg 47.5 mg          INR 2.0 1.8 2.3 1.7           Notes   1 boost              Phone Interview:  Tablet Strength: 5mg  Patient Contact Info: [phone] (home- preferred)  back up phone [phone] (mobile) may speak to Skip Webster (son)  Estimated OOP cost: [please send to registration if not already done]  Verbal Release Authorization - Received 7/28/22  Lab Contact Info: [facility]    Patient Findings  Negatives:  Signs/symptoms of thrombosis, Signs/symptoms of bleeding, Laboratory test error suspected, Change in health, Change in alcohol use, Change in activity, Upcoming invasive procedure, Emergency department visit, Upcoming dental procedure, Missed doses, Extra doses, Change in medications, Change in diet/appetite, Hospital admission, Bruising, Other complaints   Comments:  Mr Webster denies any changes in medications, diet, or health this past week.      Plan:  1. INR is subtherapeutic today. Based on recent trend, recommend that Mr. Webster increase his weekly dose to warfarin 7.5mg daily except 5mg TuesThurs.   2. Repeat INR in  one week.  3. Verbal information provided over the phone to Zack Webster. He expresses understanding by teach back, RBV dosing instructions, and has no further questions at this time.    Laly Jamison, PharmD, BCPS  9/23/2022  13:55 EDT

## 2022-09-26 ENCOUNTER — ANTICOAGULATION VISIT (OUTPATIENT)
Dept: PHARMACY | Facility: HOSPITAL | Age: 82
End: 2022-09-26

## 2022-09-26 DIAGNOSIS — I82.90 THROMBUS: Primary | ICD-10-CM

## 2022-09-30 ENCOUNTER — ANTICOAGULATION VISIT (OUTPATIENT)
Dept: PHARMACY | Facility: HOSPITAL | Age: 82
End: 2022-09-30

## 2022-09-30 DIAGNOSIS — I82.90 THROMBUS: Primary | ICD-10-CM

## 2022-09-30 LAB — INR PPP: 1.9

## 2022-09-30 RX ORDER — WARFARIN SODIUM 5 MG/1
TABLET ORAL
Qty: 100 TABLET | Refills: 0 | Status: SHIPPED | OUTPATIENT
Start: 2022-09-30 | End: 2022-12-02

## 2022-09-30 NOTE — PROGRESS NOTES
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  Goal INR: 2-3  Current Drug Interactions: levothyroxine and ASA    Bleed Risk: bleeding stomach ulcers x 2 occassions 1970 and 1973.     Diet: green beans and tossed salad; low amount understands consistency  Alcohol: None  Tobacco: None  OTC Pain Medication: APAP    INR History:  Date 6/22 6/27 6/30 7/5 7/8 7/12 7/15 7/22 7/29 8/5 8/12 8/26   Total Weekly Dose 0mg 0mg 17.5mg 35mg 37.5mg 42.5mg 42.5 mg 42.5 mg 45 mg 45 mg 45 mg 45 mg   INR 1.1 1.0 1.2 1.5 1.7 2.0 2.1 1.9 2.1 2.2 2.4 2.6   Notes          rec 8/8       Date 9/2 9/9 9/16 9/23 9/30          Total Weekly Dose 45 mg 45 mg 47.5 mg 45 mg 47.5 mg          INR 2.0 1.8 2.3 1.7 1.9          Notes   1 boost              Phone Interview:  Tablet Strength: 5mg  Patient Contact Info: [phone] (home- preferred)  back up phone [phone] (mobile) may speak to Skip Webster (son)  Estimated OOP cost: [please send to registration if not already done]  Verbal Release Authorization - Received 7/28/22  Lab Contact Info: [facility]    Patient Findings  Negatives:  Signs/symptoms of thrombosis, Signs/symptoms of bleeding, Laboratory test error suspected, Change in health, Change in alcohol use, Change in activity, Upcoming invasive procedure, Emergency department visit, Upcoming dental procedure, Missed doses, Extra doses, Change in medications, Change in diet/appetite, Hospital admission, Bruising, Other complaints   Comments:  He hasn't been eating better but no increase in LGV     Moving around more and feeling better.         Plan:  1. INR is subtherapeutic today. Based on recent trend, recommend that Mr. Webster increase his weekly dose to warfarin 7.5mg daily except 5mg  Chen.   2. Repeat INR in one week.  3. Verbal information provided over the phone to Zack Webster. He expresses understanding by teach back, RBV dosing instructions, and has no further questions at this time.    Rodney Jones, PharmD  9/30/2022  16:32 EDT

## 2022-10-05 ENCOUNTER — HOSPITAL ENCOUNTER (OUTPATIENT)
Dept: CARDIOLOGY | Facility: HOSPITAL | Age: 82
Discharge: HOME | End: 2022-10-05
Admitting: NURSE PRACTITIONER

## 2022-10-05 ENCOUNTER — OFFICE VISIT (OUTPATIENT)
Dept: CARDIOLOGY | Facility: CLINIC | Age: 82
End: 2022-10-05

## 2022-10-05 VITALS
HEART RATE: 63 BPM | OXYGEN SATURATION: 97 % | SYSTOLIC BLOOD PRESSURE: 148 MMHG | BODY MASS INDEX: 27.2 KG/M2 | WEIGHT: 190 LBS | DIASTOLIC BLOOD PRESSURE: 80 MMHG | HEIGHT: 70 IN

## 2022-10-05 VITALS — HEIGHT: 70 IN | WEIGHT: 183 LBS | BODY MASS INDEX: 26.2 KG/M2

## 2022-10-05 DIAGNOSIS — I35.0 AORTIC STENOSIS, SEVERE: Primary | ICD-10-CM

## 2022-10-05 DIAGNOSIS — R94.31 ABNORMAL ELECTROCARDIOGRAM (ECG) (EKG): ICD-10-CM

## 2022-10-05 DIAGNOSIS — E78.2 MIXED HYPERLIPIDEMIA: ICD-10-CM

## 2022-10-05 DIAGNOSIS — I82.90 THROMBUS: ICD-10-CM

## 2022-10-05 DIAGNOSIS — I50.22 CHRONIC SYSTOLIC CONGESTIVE HEART FAILURE: ICD-10-CM

## 2022-10-05 DIAGNOSIS — I10 ESSENTIAL HYPERTENSION: ICD-10-CM

## 2022-10-05 PROCEDURE — 93325 DOPPLER ECHO COLOR FLOW MAPG: CPT

## 2022-10-05 PROCEDURE — 93308 TTE F-UP OR LMTD: CPT

## 2022-10-05 PROCEDURE — 99214 OFFICE O/P EST MOD 30 MIN: CPT | Performed by: INTERNAL MEDICINE

## 2022-10-05 PROCEDURE — 93321 DOPPLER ECHO F-UP/LMTD STD: CPT | Performed by: INTERNAL MEDICINE

## 2022-10-05 PROCEDURE — 93325 DOPPLER ECHO COLOR FLOW MAPG: CPT | Performed by: INTERNAL MEDICINE

## 2022-10-05 PROCEDURE — 93308 TTE F-UP OR LMTD: CPT | Performed by: INTERNAL MEDICINE

## 2022-10-05 PROCEDURE — 93321 DOPPLER ECHO F-UP/LMTD STD: CPT

## 2022-10-05 RX ORDER — ATORVASTATIN CALCIUM 80 MG/1
80 TABLET, FILM COATED ORAL DAILY
COMMUNITY
End: 2022-10-06 | Stop reason: SDUPTHER

## 2022-10-05 RX ORDER — PANTOPRAZOLE SODIUM 40 MG/1
40 TABLET, DELAYED RELEASE ORAL DAILY
COMMUNITY
Start: 2022-10-03

## 2022-10-06 LAB
BH CV ECHO MEAS - AO MAX PG: 23.7 MMHG
BH CV ECHO MEAS - AO MEAN PG: 15.3 MMHG
BH CV ECHO MEAS - AO V2 MAX: 243.3 CM/SEC
BH CV ECHO MEAS - AO V2 VTI: 57.7 CM
BH CV ECHO MEAS - AVA(I,D): 1.51 CM2
BH CV ECHO MEAS - EDV(CUBED): 389 ML
BH CV ECHO MEAS - EDV(MOD-SP2): 184 ML
BH CV ECHO MEAS - EDV(MOD-SP4): 217 ML
BH CV ECHO MEAS - EF(MOD-BP): 37.8 %
BH CV ECHO MEAS - EF(MOD-SP2): 45.7 %
BH CV ECHO MEAS - EF(MOD-SP4): 36.4 %
BH CV ECHO MEAS - ESV(CUBED): 175.6 ML
BH CV ECHO MEAS - ESV(MOD-SP2): 100 ML
BH CV ECHO MEAS - ESV(MOD-SP4): 138 ML
BH CV ECHO MEAS - FS: 23.3 %
BH CV ECHO MEAS - IVS/LVPW: 1 CM
BH CV ECHO MEAS - IVSD: 1.1 CM
BH CV ECHO MEAS - LA DIMENSION: 4.4 CM
BH CV ECHO MEAS - LV DIASTOLIC VOL/BSA (35-75): 108 CM2
BH CV ECHO MEAS - LV MASS(C)D: 390.3 GRAMS
BH CV ECHO MEAS - LV MAX PG: 6.3 MMHG
BH CV ECHO MEAS - LV MEAN PG: 4 MMHG
BH CV ECHO MEAS - LV SYSTOLIC VOL/BSA (12-30): 68.7 CM2
BH CV ECHO MEAS - LV V1 MAX: 125 CM/SEC
BH CV ECHO MEAS - LV V1 VTI: 27.7 CM
BH CV ECHO MEAS - LVIDD: 6 CM
BH CV ECHO MEAS - LVIDS: 5.6 CM
BH CV ECHO MEAS - LVOT AREA: 3.1 CM2
BH CV ECHO MEAS - LVOT DIAM: 2 CM
BH CV ECHO MEAS - LVPWD: 1.1 CM
BH CV ECHO MEAS - SI(MOD-SP2): 41.8 ML/M2
BH CV ECHO MEAS - SI(MOD-SP4): 39.3 ML/M2
BH CV ECHO MEAS - SV(LVOT): 87 ML
BH CV ECHO MEAS - SV(MOD-SP2): 84 ML
BH CV ECHO MEAS - SV(MOD-SP4): 79 ML
BH CV VAS BP RIGHT ARM: NORMAL MMHG
LV EF 2D ECHO EST: 30 %
MAXIMAL PREDICTED HEART RATE: 138 BPM
STRESS TARGET HR: 117 BPM

## 2022-10-06 RX ORDER — ATORVASTATIN CALCIUM 80 MG/1
80 TABLET, FILM COATED ORAL DAILY
Qty: 90 TABLET | Refills: 1 | Status: SHIPPED | OUTPATIENT
Start: 2022-10-06 | End: 2023-01-10 | Stop reason: SDUPTHER

## 2022-10-07 ENCOUNTER — ANTICOAGULATION VISIT (OUTPATIENT)
Dept: PHARMACY | Facility: HOSPITAL | Age: 82
End: 2022-10-07

## 2022-10-07 DIAGNOSIS — I82.90 THROMBUS: Primary | ICD-10-CM

## 2022-10-07 LAB — INR PPP: 1.8

## 2022-10-07 NOTE — PROGRESS NOTES
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  Goal INR: 2-3  Current Drug Interactions: levothyroxine and ASA    Bleed Risk: bleeding stomach ulcers x 2 occassions 1970 and 1973.     Diet: green beans and tossed salad; low amount understands consistency  Alcohol: None  Tobacco: None  OTC Pain Medication: APAP    INR History:  Date 6/22 6/27 6/30 7/5 7/8 7/12 7/15 7/22 7/29 8/5 8/12 8/26   Total Weekly Dose 0mg 0mg 17.5mg 35mg 37.5mg 42.5mg 42.5 mg 42.5 mg 45 mg 45 mg 45 mg 45 mg   INR 1.1 1.0 1.2 1.5 1.7 2.0 2.1 1.9 2.1 2.2 2.4 2.6   Notes          rec 8/8       Date 9/2 9/9 9/16 9/23 9/30 10/7         Total Weekly Dose 45 mg 45 mg 47.5 mg 45 mg 47.5 mg 50 mg         INR 2.0 1.8 2.3 1.7 1.9 1.8         Notes   1 boost              Phone Interview:  Tablet Strength: 5mg  Patient Contact Info: [phone] (home- preferred)  back up phone [phone] (mobile) may speak to Skip Webster (son)  Estimated OOP cost: [please send to registration if not already done]  Verbal Release Authorization - Received 7/28/22  Lab Contact Info: [facility]    Patient Findings    Negatives:  Signs/symptoms of thrombosis, Signs/symptoms of bleeding, Laboratory test error suspected, Change in health, Change in alcohol use, Change in activity, Upcoming invasive procedure, Emergency department visit, Upcoming dental procedure, Missed doses, Extra doses, Change in medications, Change in diet/appetite, Hospital admission, Bruising, Other complaints   Comments:  Confirmed warfarin dosing with Mr Webster.     Plan:  1. INR is subtherapeutic today at 1.8. Per Rodney Jones, PharmD, instructed Mr. Webster to boost tonights warfarin dose to 10 mg then continue warfarin 7.5mg daily except 5mg  Chen.   2. Repeat INR in one week, 7/14/22..  3. Verbal information provided over the phone to Zack Webster. He expresses understanding by teach back, RBV dosing instructions, and has no further questions at this time.    Erick Drew CPhT  10/7/2022  14:54 EDT     I, Rodney Jones, PharmD, have reviewed the note in full and agree with the assessment and plan.  10/07/22  15:12 EDT

## 2022-10-14 ENCOUNTER — ANTICOAGULATION VISIT (OUTPATIENT)
Dept: PHARMACY | Facility: HOSPITAL | Age: 82
End: 2022-10-14

## 2022-10-14 DIAGNOSIS — I82.90 THROMBUS: Primary | ICD-10-CM

## 2022-10-14 LAB — INR PPP: 1.9

## 2022-10-14 NOTE — PROGRESS NOTES
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  Goal INR: 2-3  Current Drug Interactions: levothyroxine and ASA    Bleed Risk: bleeding stomach ulcers x 2 occassions 1970 and 1973.     Diet: green beans and tossed salad; low amount understands consistency  Alcohol: None  Tobacco: None  OTC Pain Medication: APAP    INR History:  Date 6/22 6/27 6/30 7/5 7/8 7/12 7/15 7/22 7/29 8/5 8/12 8/26   Total Weekly Dose 0mg 0mg 17.5mg 35mg 37.5mg 42.5mg 42.5 mg 42.5 mg 45 mg 45 mg 45 mg 45 mg   INR 1.1 1.0 1.2 1.5 1.7 2.0 2.1 1.9 2.1 2.2 2.4 2.6   Notes          rec 8/8       Date 9/2 9/9 9/16 9/23 9/30 10/7 10/14        Total Weekly Dose 45 mg 45 mg 47.5 mg 45 mg 47.5 mg 50 mg 50 mg        INR 2.0 1.8 2.3 1.7 1.9 1.8 1.9        Notes   1 boost              Phone Interview:  Tablet Strength: 5mg  Patient Contact Info: [phone] (home- preferred)  back up phone [phone] (mobile) may speak to Skip Webster (son)  Estimated OOP cost: [please send to registration if not already done]  Verbal Release Authorization - Received 7/28/22  Lab Contact Info: [facility]    Patient Findings  Negatives:  Signs/symptoms of thrombosis, Signs/symptoms of bleeding, Laboratory test error suspected, Change in health, Change in alcohol use, Change in activity, Upcoming invasive procedure, Emergency department visit, Upcoming dental procedure, Missed doses, Extra doses, Change in medications, Change in diet/appetite, Hospital admission, Bruising, Other complaints   Comments:  No changes per patient. Dosing confirmed.     Plan:  1. INR is slightly subtherapeutic again today at 1.9. Have instructed Mr. Webster to take a dose of warfarin 7.5 mg daily except for warfarin 5 mg Thurs until recheck (same 50  mg weekly dose but more evenly spread out). Suspect INR therapeutic earlier in week after 10 mg last Friday but effects likely worn off.  2. Repeat INR in one week, 10/21/22.  3. Verbal information provided over the phone to Zack Webster. He expresses understanding by teach back, RBV dosing instructions, and has no further questions at this time.    Rafi Solis, PharmD, BCPS  10/14/2022  14:39 EDT

## 2022-10-21 ENCOUNTER — ANTICOAGULATION VISIT (OUTPATIENT)
Dept: PHARMACY | Facility: HOSPITAL | Age: 82
End: 2022-10-21

## 2022-10-21 DIAGNOSIS — I82.90 THROMBUS: Primary | ICD-10-CM

## 2022-10-21 LAB — INR PPP: 1.7

## 2022-10-21 NOTE — PROGRESS NOTES
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  Goal INR: 2-3  Current Drug Interactions: levothyroxine and ASA    Bleed Risk: bleeding stomach ulcers x 2 occassions 1970 and 1973.     Diet: green beans and tossed salad; low amount understands consistency  Alcohol: None  Tobacco: None  OTC Pain Medication: APAP    INR History:  Date 6/22 6/27 6/30 7/5 7/8 7/12 7/15 7/22 7/29 8/5 8/12 8/26   Total Weekly Dose 0mg 0mg 17.5mg 35mg 37.5mg 42.5mg 42.5 mg 42.5 mg 45 mg 45 mg 45 mg 45 mg   INR 1.1 1.0 1.2 1.5 1.7 2.0 2.1 1.9 2.1 2.2 2.4 2.6   Notes          rec 8/8       Date 9/2 9/9 9/16 9/23 9/30 10/7 10/14 10/21       Total Weekly Dose 45 mg 45 mg 47.5 mg 45 mg 47.5 mg 50 mg 50 mg 50mg       INR 2.0 1.8 2.3 1.7 1.9 1.8 1.9 1.7       Notes   1 boost              Phone Interview:  Tablet Strength: 5mg  Patient Contact Info: [phone] (home- preferred)  back up phone [phone] (mobile) may speak to Skip Webster (son)  Estimated OOP cost: [please send to registration if not already done]  Verbal Release Authorization - Received 7/28/22  Lab Contact Info: [facility]    Patient Findings    Negatives:  Signs/symptoms of thrombosis, Signs/symptoms of bleeding, Laboratory test error suspected, Change in health, Change in alcohol use, Change in activity, Upcoming invasive procedure, Emergency department visit, Upcoming dental procedure, Missed doses, Extra doses, Change in medications, Change in diet/appetite, Hospital admission, Bruising, Other complaints   Comments:  Unable to determine cause of increased dosing needs       Plan:  1. INR is slightly subtherapeutic again today at 1.7. Have instructed Mr. Webster to boost tonight's dose to 10mg then increase dose to warfarin 7.5  mg daily until recheck .   2. Repeat INR in in ~1.5 weeks.  3. Verbal information provided over the phone to Zack Webster. He expresses understanding by teach back, RBV dosing instructions, and has no further questions at this time.      Chinyere Dunn, JaniceD.  10/21/22   14:43 EDT

## 2022-11-01 ENCOUNTER — ANTICOAGULATION VISIT (OUTPATIENT)
Dept: PHARMACY | Facility: HOSPITAL | Age: 82
End: 2022-11-01

## 2022-11-01 DIAGNOSIS — I82.90 THROMBUS: Primary | ICD-10-CM

## 2022-11-01 LAB — INR PPP: 2

## 2022-11-01 NOTE — PROGRESS NOTES
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  Goal INR: 2-3  Current Drug Interactions: levothyroxine and ASA    Bleed Risk: bleeding stomach ulcers x 2 occassions 1970 and 1973.     Diet: green beans and tossed salad; low amount understands consistency  Alcohol: None  Tobacco: None  OTC Pain Medication: APAP    INR History:  Date 6/22 6/27 6/30 7/5 7/8 7/12 7/15 7/22 7/29 8/5 8/12 8/26   Total Weekly Dose 0mg 0mg 17.5mg 35mg 37.5mg 42.5mg 42.5 mg 42.5 mg 45 mg 45 mg 45 mg 45 mg   INR 1.1 1.0 1.2 1.5 1.7 2.0 2.1 1.9 2.1 2.2 2.4 2.6   Notes          rec 8/8       Date 9/2 9/9 9/16 9/23 9/30 10/7 10/14 10/21 11/1      Total Weekly Dose 45 mg 45 mg 47.5 mg 45 mg 47.5 mg 50 mg 50 mg 50mg 55 mg      INR 2.0 1.8 2.3 1.7 1.9 1.8 1.9 1.7 2.0      Notes   1 boost              Phone Interview:  Tablet Strength: 5mg  Patient Contact Info: [phone] (home- preferred)  back up phone [phone] (mobile) may speak to Skip Webster (son)  Estimated OOP cost: [please send to registration if not already done]  Verbal Release Authorization - Received 7/28/22  Lab Contact Info: [facility]    Patient Findings    Positives:  Extra doses   Negatives:  Signs/symptoms of thrombosis, Signs/symptoms of bleeding, Laboratory test error suspected, Change in health, Change in alcohol use, Change in activity, Upcoming invasive procedure, Emergency department visit, Upcoming dental procedure, Missed doses, Change in medications, Change in diet/appetite, Hospital admission, Bruising, Other complaints   Comments:  Mr Webster took warfarin 10 mg consecutive Fri instead of one boost. All other findings negative.      Plan:  1. INR is therapeutic today at 2.0. Instructed Mr. Cames to continue warfarin  7.5 mg daily except warfarin 10 mg Fri until recheck.   2. Repeat INR in two weeks, 11/15/22..  3. Verbal information provided over the phone to Zack Webster. He expresses understanding by teach back, RBV dosing instructions, and has no further questions at this time.    Erick Drew CPhT  11/1/2022  14:16 EDT     I, Trina Waldrop, PharmD, have reviewed the note in full and agree with the assessment and plan.  11/01/22  14:52 EDT

## 2022-11-15 ENCOUNTER — ANTICOAGULATION VISIT (OUTPATIENT)
Dept: PHARMACY | Facility: HOSPITAL | Age: 82
End: 2022-11-15

## 2022-11-15 DIAGNOSIS — I82.90 THROMBUS: Primary | ICD-10-CM

## 2022-11-15 LAB — INR PPP: 2.1

## 2022-11-15 NOTE — PROGRESS NOTES
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  Goal INR: 2-3  Current Drug Interactions: levothyroxine and ASA    Bleed Risk: bleeding stomach ulcers x 2 occassions 1970 and 1973.     Diet: green beans and tossed salad; low amount understands consistency  Alcohol: None  Tobacco: None  OTC Pain Medication: APAP    INR History:  Date 6/22 6/27 6/30 7/5 7/8 7/12 7/15 7/22 7/29 8/5 8/12 8/26   Total Weekly Dose 0mg 0mg 17.5mg 35mg 37.5mg 42.5mg 42.5 mg 42.5 mg 45 mg 45 mg 45 mg 45 mg   INR 1.1 1.0 1.2 1.5 1.7 2.0 2.1 1.9 2.1 2.2 2.4 2.6   Notes          rec 8/8       Date 9/2 9/9 9/16 9/23 9/30 10/7 10/14 10/21 11/1 11/15     Total Weekly Dose 45 mg 45 mg 47.5 mg 45 mg 47.5 mg 50 mg 50 mg 50mg 55 mg 55 mg     INR 2.0 1.8 2.3 1.7 1.9 1.8 1.9 1.7 2.0 2.1     Notes   1 boost              Phone Interview:  Tablet Strength: 5mg  Patient Contact Info: [phone] (home- preferred)  back up phone [phone] (mobile) may speak to Skip Webster (son)  Estimated OOP cost: [please send to registration if not already done]  Verbal Release Authorization - Received 7/28/22  Lab Contact Info: [facility]    Patient Findings    Negatives:  Signs/symptoms of thrombosis, Signs/symptoms of bleeding, Laboratory test error suspected, Change in health, Change in alcohol use, Change in activity, Upcoming invasive procedure, Emergency department visit, Upcoming dental procedure, Missed doses, Extra doses, Change in medications, Change in diet/appetite, Hospital admission, Bruising, Other complaints   Comments:  All findings negative per pt.      Plan:  1. INR is therapeutic today at 2.1. Instructed Mr. Webster to continue warfarin 7.5 mg daily except warfarin 10 mg Fri until recheck.   2. Repeat  INR in two weeks, 11/29/22..  3. Verbal information provided over the phone to Zack Webster. He expresses understanding by teach back, RBV dosing instructions, and has no further questions at this time.    Erick Drew CPhT  11/15/2022  13:58 GREGORY EVANS, Rafi Solis, PharmD, have reviewed the note in full and agree with the assessment and plan.  11/15/22  14:23 EST

## 2022-11-29 ENCOUNTER — ANTICOAGULATION VISIT (OUTPATIENT)
Dept: PHARMACY | Facility: HOSPITAL | Age: 82
End: 2022-11-29

## 2022-11-29 DIAGNOSIS — I82.90 THROMBUS: Primary | ICD-10-CM

## 2022-11-29 LAB — INR PPP: 2.6

## 2022-11-29 NOTE — PROGRESS NOTES
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  Goal INR: 2-3  Current Drug Interactions: levothyroxine and ASA    Bleed Risk: bleeding stomach ulcers x 2 occassions 1970 and 1973.     Diet: green beans and tossed salad; low amount understands consistency  Alcohol: None  Tobacco: None  OTC Pain Medication: APAP    INR History:  Date 6/22 6/27 6/30 7/5 7/8 7/12 7/15 7/22 7/29 8/5 8/12 8/26   Total Weekly Dose 0mg 0mg 17.5mg 35mg 37.5mg 42.5mg 42.5 mg 42.5 mg 45 mg 45 mg 45 mg 45 mg   INR 1.1 1.0 1.2 1.5 1.7 2.0 2.1 1.9 2.1 2.2 2.4 2.6   Notes          rec 8/8       Date 9/2 9/9 9/16 9/23 9/30 10/7 10/14 10/21 11/1 11/15 11/29       Total Weekly Dose 45 mg 45 mg 47.5 mg 45 mg 47.5 mg 50 mg 50 mg 50mg 55 mg 55 mg 55 mg       INR 2.0 1.8 2.3 1.7 1.9 1.8 1.9 1.7 2.0 2.1 2.6       Notes   1 boost                 Phone Interview:  Tablet Strength: 5mg  Patient Contact Info: [phone] (home- preferred)  back up phone [phone] (mobile) may speak to Skip Webster (son)  Estimated OOP cost: [please send to registration if not already done]  Verbal Release Authorization - Received 7/28/22  Lab Contact Info: [facility]    Patient Findings  Negatives:  Signs/symptoms of thrombosis, Signs/symptoms of bleeding, Laboratory test error suspected, Change in health, Change in alcohol use, Change in activity, Upcoming invasive procedure, Emergency department visit, Upcoming dental procedure, Missed doses, Extra doses, Change in medications, Change in diet/appetite, Hospital admission, Bruising, Other complaints   Comments:  All findings negative per pt.      Plan:  1. INR is therapeutic today at 2.6.  Instructed Mr. Webster to continue warfarin 7.5 mg oral daily except warfarin 10 mg  Fridays until recheck.   2. Repeat INR in two weeks, 12/13/22..  3. Verbal information provided over the phone to Zack Webster. He expresses understanding by teach back, RBV dosing instructions, and has no further questions at this time.    Erick Drew, Aung  11/29/2022  15:28 GREGORY EVANS, Renea Craig, PharmD, have reviewed the note in full and agree with the assessment and plan.  11/29/22  16:19 EST

## 2022-12-02 DIAGNOSIS — I82.90 THROMBUS: ICD-10-CM

## 2022-12-02 RX ORDER — WARFARIN SODIUM 5 MG/1
TABLET ORAL
Qty: 135 TABLET | Refills: 0 | Status: SHIPPED | OUTPATIENT
Start: 2022-12-02 | End: 2023-03-09 | Stop reason: SDUPTHER

## 2022-12-07 ENCOUNTER — TELEPHONE (OUTPATIENT)
Dept: PHARMACY | Facility: HOSPITAL | Age: 82
End: 2022-12-07

## 2022-12-07 DIAGNOSIS — I82.90 THROMBUS: ICD-10-CM

## 2022-12-07 RX ORDER — WARFARIN SODIUM 5 MG/1
TABLET ORAL
Qty: 135 TABLET | Refills: 0 | OUTPATIENT
Start: 2022-12-07

## 2022-12-13 ENCOUNTER — ANTICOAGULATION VISIT (OUTPATIENT)
Dept: PHARMACY | Facility: HOSPITAL | Age: 82
End: 2022-12-13

## 2022-12-13 DIAGNOSIS — I82.90 THROMBUS: Primary | ICD-10-CM

## 2022-12-13 LAB — INR PPP: 2.6

## 2022-12-13 NOTE — PROGRESS NOTES
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  Goal INR: 2-3  Current Drug Interactions: levothyroxine and ASA    Bleed Risk: bleeding stomach ulcers x 2 occassions 1970 and 1973.     Diet: green beans and tossed salad; low amount understands consistency  Alcohol: None  Tobacco: None  OTC Pain Medication: APAP    INR History:  Date 6/22 6/27 6/30 7/5 7/8 7/12 7/15 7/22 7/29 8/5 8/12 8/26   Total Weekly Dose 0mg 0mg 17.5mg 35mg 37.5mg 42.5mg 42.5 mg 42.5 mg 45 mg 45 mg 45 mg 45 mg   INR 1.1 1.0 1.2 1.5 1.7 2.0 2.1 1.9 2.1 2.2 2.4 2.6   Notes          rec 8/8       Date 9/2 9/9 9/16 9/23 9/30 10/7 10/14 10/21 11/1 11/15 11/29 12/13   Total Weekly Dose 45 mg 45 mg 47.5 mg 45 mg 47.5 mg 50 mg 50 mg 50mg 55 mg 55 mg 55 mg 55 mg   INR 2.0 1.8 2.3 1.7 1.9 1.8 1.9 1.7 2.0 2.1 2.6 2.6   Notes   1 boost              Phone Interview:  Tablet Strength: 5mg  Patient Contact Info: [phone] (home- preferred)  back up phone [phone] (mobile) may speak to Skip Webster (son)  Estimated OOP cost: [please send to registration if not already done]  Verbal Release Authorization - Received 7/28/22  Lab Contact Info: [facility]    Patient Findings  Negatives:  Signs/symptoms of thrombosis, Signs/symptoms of bleeding, Laboratory test error suspected, Change in health, Change in alcohol use, Change in activity, Upcoming invasive procedure, Emergency department visit, Upcoming dental procedure, Missed doses, Extra doses, Change in medications, Change in diet/appetite, Hospital admission, Bruising, Other complaints   Comments:  All findings negative per pt.      Plan:  1. INR is therapeutic today at 2.6.  Instructed Mr. Webster to continue warfarin 7.5 mg oral daily except warfarin 10 mg  Fridays until recheck.   2. Repeat INR in two weeks, 12/27/22..  3. Verbal information provided over the phone to Zack Webster. He expresses understanding by teach back, RBV dosing instructions, and has no further questions at this time.    Erick Drew, Aung  12/13/2022  15:27 Chinyere WOLF, PharmD, have reviewed the note in full and agree with the assessment and plan.  12/13/22  16:12 EST

## 2022-12-27 ENCOUNTER — ANTICOAGULATION VISIT (OUTPATIENT)
Dept: PHARMACY | Facility: HOSPITAL | Age: 82
End: 2022-12-27

## 2022-12-27 DIAGNOSIS — I82.90 THROMBUS: Primary | ICD-10-CM

## 2022-12-27 LAB — INR PPP: 3.7

## 2022-12-27 NOTE — PROGRESS NOTES
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  Goal INR: 2-3  Current Drug Interactions: levothyroxine and ASA    Bleed Risk: bleeding stomach ulcers x 2 occassions 1970 and 1973.     Diet: green beans and tossed salad; low amount understands consistency  Alcohol: None  Tobacco: None  OTC Pain Medication: APAP    INR History:  Date 6/22 6/27 6/30 7/5 7/8 7/12 7/15 7/22 7/29 8/5 8/12 8/26   Total Weekly Dose 0mg 0mg 17.5mg 35mg 37.5mg 42.5mg 42.5 mg 42.5 mg 45 mg 45 mg 45 mg 45 mg   INR 1.1 1.0 1.2 1.5 1.7 2.0 2.1 1.9 2.1 2.2 2.4 2.6   Notes          rec 8/8       Date 9/2 9/9 9/16 9/23 9/30 10/7 10/14 10/21 11/1 11/15 11/29 12/13   Total Weekly Dose 45 mg 45 mg 47.5 mg 45 mg 47.5 mg 50 mg 50 mg 50mg 55 mg 55 mg 55 mg 55 mg   INR 2.0 1.8 2.3 1.7 1.9 1.8 1.9 1.7 2.0 2.1 2.6 2.6   Notes   1 boost              Date 12/27           Total Weekly Dose 55mg           INR 3.7           Notes                  Phone Interview:  Tablet Strength: 5mg  Patient Contact Info: [phone] (home- preferred)  back up phone [phone] (mobile) may speak to Skip Webster (son)  Estimated OOP cost: [please send to registration if not already done]  Verbal Release Authorization - Received 7/28/22  Lab Contact Info: [facility]    Patient Findings      Negatives:  Signs/symptoms of thrombosis, Signs/symptoms of bleeding, Laboratory test error suspected, Change in health, Change in alcohol use, Change in activity, Upcoming invasive procedure, Emergency department visit, Upcoming dental procedure, Missed doses, Extra doses, Change in medications, Change in diet/appetite, Hospital admission, Bruising, Other complaints         Plan:  1. INR is SUPRAtherapeutic today at 3.7, unable to determine  likely cause.  Instructed Mr. Webster to reduce tonight's dose to 2.5mg then  continue warfarin 7.5 mg oral daily except warfarin 10 mg Fridays until recheck.   2. Repeat INR in two weeks, 1/10  3. Verbal information provided over the phone to Zack Webster. He expresses understanding by teach back, RBV dosing instructions, and has no further questions at this time.    Janice HernandezD.  12/27/22   13:44 EST

## 2023-01-05 ENCOUNTER — HOSPITAL ENCOUNTER (OUTPATIENT)
Dept: CARDIOLOGY | Facility: HOSPITAL | Age: 83
Discharge: HOME | End: 2023-01-05
Admitting: INTERNAL MEDICINE
Payer: MEDICARE

## 2023-01-05 DIAGNOSIS — I10 ESSENTIAL HYPERTENSION: ICD-10-CM

## 2023-01-05 DIAGNOSIS — I35.0 AORTIC STENOSIS, SEVERE: ICD-10-CM

## 2023-01-05 DIAGNOSIS — I50.22 CHRONIC SYSTOLIC CONGESTIVE HEART FAILURE: ICD-10-CM

## 2023-01-05 PROCEDURE — 25010000002 SULFUR HEXAFLUORIDE MICROSPH 60.7-25 MG RECONSTITUTED SUSPENSION: Performed by: INTERNAL MEDICINE

## 2023-01-05 PROCEDURE — 93321 DOPPLER ECHO F-UP/LMTD STD: CPT

## 2023-01-05 PROCEDURE — 93308 TTE F-UP OR LMTD: CPT

## 2023-01-05 PROCEDURE — 93325 DOPPLER ECHO COLOR FLOW MAPG: CPT

## 2023-01-05 PROCEDURE — 93325 DOPPLER ECHO COLOR FLOW MAPG: CPT | Performed by: INTERNAL MEDICINE

## 2023-01-05 PROCEDURE — 93321 DOPPLER ECHO F-UP/LMTD STD: CPT | Performed by: INTERNAL MEDICINE

## 2023-01-05 PROCEDURE — 93308 TTE F-UP OR LMTD: CPT | Performed by: INTERNAL MEDICINE

## 2023-01-05 RX ADMIN — SULFUR HEXAFLUORIDE 3 ML: KIT at 14:50

## 2023-01-06 LAB
BH CV ECHO MEAS - AO MAX PG: 31.3 MMHG
BH CV ECHO MEAS - AO MEAN PG: 14.2 MMHG
BH CV ECHO MEAS - AO ROOT DIAM: 3 CM
BH CV ECHO MEAS - AO V2 MAX: 278.5 CM/SEC
BH CV ECHO MEAS - AO V2 VTI: 61.8 CM
BH CV ECHO MEAS - AVA(I,D): 1.37 CM2
BH CV ECHO MEAS - EDV(CUBED): 302.2 ML
BH CV ECHO MEAS - EDV(MOD-SP2): 193 ML
BH CV ECHO MEAS - EDV(MOD-SP4): 189 ML
BH CV ECHO MEAS - EF(MOD-BP): 38.9 %
BH CV ECHO MEAS - EF(MOD-SP2): 39.9 %
BH CV ECHO MEAS - EF(MOD-SP4): 32.3 %
BH CV ECHO MEAS - ESV(CUBED): 129.2 ML
BH CV ECHO MEAS - ESV(MOD-SP2): 116 ML
BH CV ECHO MEAS - ESV(MOD-SP4): 128 ML
BH CV ECHO MEAS - FS: 24.7 %
BH CV ECHO MEAS - IVS/LVPW: 1.18 CM
BH CV ECHO MEAS - IVSD: 1.36 CM
BH CV ECHO MEAS - LA DIMENSION: 3.9 CM
BH CV ECHO MEAS - LV MASS(C)D: 402.3 GRAMS
BH CV ECHO MEAS - LV MAX PG: 1.7 MMHG
BH CV ECHO MEAS - LV MEAN PG: 0.98 MMHG
BH CV ECHO MEAS - LV V1 MAX: 65.1 CM/SEC
BH CV ECHO MEAS - LV V1 VTI: 16 CM
BH CV ECHO MEAS - LVIDD: 6.1 CM
BH CV ECHO MEAS - LVIDS: 5.1 CM
BH CV ECHO MEAS - LVOT AREA: 5.3 CM2
BH CV ECHO MEAS - LVOT DIAM: 2.6 CM
BH CV ECHO MEAS - LVPWD: 1.15 CM
BH CV ECHO MEAS - SV(LVOT): 84.6 ML
BH CV ECHO MEAS - SV(MOD-SP2): 77 ML
BH CV ECHO MEAS - SV(MOD-SP4): 61 ML
LV EF 2D ECHO EST: 30 %
MAXIMAL PREDICTED HEART RATE: 137 BPM
STRESS TARGET HR: 116 BPM

## 2023-01-10 ENCOUNTER — ANTICOAGULATION VISIT (OUTPATIENT)
Dept: PHARMACY | Facility: HOSPITAL | Age: 83
End: 2023-01-10
Payer: MEDICARE

## 2023-01-10 DIAGNOSIS — I82.90 THROMBUS: Primary | ICD-10-CM

## 2023-01-10 LAB — INR PPP: 3.1

## 2023-01-10 RX ORDER — ATORVASTATIN CALCIUM 80 MG/1
80 TABLET, FILM COATED ORAL DAILY
Qty: 90 TABLET | Refills: 0 | Status: SHIPPED | OUTPATIENT
Start: 2023-01-10 | End: 2023-02-27

## 2023-01-10 NOTE — PROGRESS NOTES
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  Goal INR: 2-3  Current Drug Interactions: levothyroxine and ASA    Bleed Risk: bleeding stomach ulcers x 2 occassions 1970 and 1973.     Diet: green beans and tossed salad; avoids 1/10/23  Alcohol: None  Tobacco: None  OTC Pain Medication: APAP    INR History:  Date 6/22 6/27 6/30 7/5 7/8 7/12 7/15 7/22 7/29 8/5 8/12 8/26   Total Weekly Dose 0mg 0mg 17.5mg 35mg 37.5mg 42.5mg 42.5 mg 42.5 mg 45 mg 45 mg 45 mg 45 mg   INR 1.1 1.0 1.2 1.5 1.7 2.0 2.1 1.9 2.1 2.2 2.4 2.6   Notes          rec 8/8       Date 9/2 9/9 9/16 9/23 9/30 10/7 10/14 10/21 11/1 11/15 11/29 12/13   Total Weekly Dose 45 mg 45 mg 47.5 mg 45 mg 47.5 mg 50 mg 50 mg 50mg 55 mg 55 mg 55 mg 55 mg   INR 2.0 1.8 2.3 1.7 1.9 1.8 1.9 1.7 2.0 2.1 2.6 2.6   Notes   1 boost              Date 12/27 1/10/23          Total Weekly Dose 55mg 55mg          INR 3.7 3.1          Notes              Phone Interview:  Tablet Strength: 5mg  Patient Contact Info: [phone] (home- preferred)  back up phone [phone] (mobile) may speak to Sikp Webster (son)  Estimated OOP cost: [please send to registration if not already done]  Verbal Release Authorization - Received 7/28/22  Lab Contact Info: [facility]    Patient Findings  Negatives:  Signs/symptoms of thrombosis, Signs/symptoms of bleeding, Laboratory test error suspected, Change in health, Change in alcohol use, Change in activity, Upcoming invasive procedure, Emergency department visit, Upcoming dental procedure, Missed doses, Extra doses, Change in medications, Change in diet/appetite, Hospital admission, Bruising, Other complaints     Plan:  1. INR is SUPRAtherapeutic today at 3.1. Instructed Mr. Cames to reduce warfarin  7.5 mg oral daily until recheck.   2. Repeat INR 1.5 weeks, on 1/20.  3. Verbal information provided over the phone to Zack Webster. He expresses understanding by teach back, RBV dosing instructions, and has no further questions at this time.    Claritza Swann, PharmD  01/10/23   15:55 EST

## 2023-01-20 ENCOUNTER — ANTICOAGULATION VISIT (OUTPATIENT)
Dept: PHARMACY | Facility: HOSPITAL | Age: 83
End: 2023-01-20
Payer: MEDICARE

## 2023-01-20 DIAGNOSIS — I82.90 THROMBUS: Primary | ICD-10-CM

## 2023-01-20 LAB — INR PPP: 3.4

## 2023-01-20 NOTE — PROGRESS NOTES
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  Goal INR: 2-3  Current Drug Interactions: levothyroxine and ASA    Bleed Risk: bleeding stomach ulcers x 2 occassions 1970 and 1973.     Diet: green beans and tossed salad; avoids 1/10/23  Alcohol: None  Tobacco: None  OTC Pain Medication: APAP    INR History:  Date 6/22 6/27 6/30 7/5 7/8 7/12 7/15 7/22 7/29 8/5 8/12 8/26   Total Weekly Dose 0mg 0mg 17.5mg 35mg 37.5mg 42.5mg 42.5 mg 42.5 mg 45 mg 45 mg 45 mg 45 mg   INR 1.1 1.0 1.2 1.5 1.7 2.0 2.1 1.9 2.1 2.2 2.4 2.6   Notes          rec 8/8       Date 9/2 9/9 9/16 9/23 9/30 10/7 10/14 10/21 11/1 11/15 11/29 12/13   Total Weekly Dose 45 mg 45 mg 47.5 mg 45 mg 47.5 mg 50 mg 50 mg 50mg 55 mg 55 mg 55 mg 55 mg   INR 2.0 1.8 2.3 1.7 1.9 1.8 1.9 1.7 2.0 2.1 2.6 2.6   Notes   1 boost              Date 12/27 1/10/23 1/20/23         Total Weekly Dose 55mg 55mg 52.5 mg 50 mg        INR 3.7 3.1 3.4         Notes              Phone Interview:  Tablet Strength: 5mg  Patient Contact Info: [phone] (home- preferred)  back up phone [phone] (mobile) may speak to Skip Webster (son)  Estimated OOP cost: [please send to registration if not already done]  Verbal Release Authorization - Received 7/28/22  Lab Contact Info: [facility]    Patient Findings  Positives:  Signs/symptoms of bleeding   Negatives:  Signs/symptoms of thrombosis, Laboratory test error suspected, Change in health, Change in alcohol use, Change in activity, Upcoming invasive procedure, Emergency department visit, Upcoming dental procedure, Missed doses, Extra doses, Change in medications, Change in diet/appetite, Hospital admission, Bruising, Other complaints   Comments:  Nosebleed lasting about 15 minutes couple  of days ago.       Plan:  1. INR is SUPRAtherapeutic today at 3.4. Instructed Mr. Webster to reduce to warfarin 5 mg on Fridays and all other days warfarin 7.5 mg until recheck. 50 mg/week (4.8% decrease for weekly dose).  2. Repeat INR 2 weeks on 2/3/23  3. Verbal information provided over the phone to Zack Webster. He expresses understanding by teach back, RBV dosing instructions, and has no further questions at this time.    Rodney Jones, PharmD  01/20/23   14:30 EST

## 2023-01-24 ENCOUNTER — ANTICOAGULATION VISIT (OUTPATIENT)
Dept: PHARMACY | Facility: HOSPITAL | Age: 83
End: 2023-01-24
Payer: MEDICARE

## 2023-01-24 DIAGNOSIS — I82.90 THROMBUS: Primary | ICD-10-CM

## 2023-02-03 ENCOUNTER — ANTICOAGULATION VISIT (OUTPATIENT)
Dept: PHARMACY | Facility: HOSPITAL | Age: 83
End: 2023-02-03
Payer: MEDICARE

## 2023-02-03 DIAGNOSIS — I82.90 THROMBUS: Primary | ICD-10-CM

## 2023-02-03 LAB — INR PPP: 2.5

## 2023-02-03 NOTE — PROGRESS NOTES
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  Goal INR: 2-3  Current Drug Interactions: levothyroxine and ASA    Bleed Risk: bleeding stomach ulcers x 2 occassions 1970 and 1973.     Diet: green beans and tossed salad; avoids 1/10/23  Alcohol: None  Tobacco: None  OTC Pain Medication: APAP    INR History:  Date 6/22 6/27 6/30 7/5 7/8 7/12 7/15 7/22 7/29 8/5 8/12 8/26   Total Weekly Dose 0mg 0mg 17.5mg 35mg 37.5mg 42.5mg 42.5 mg 42.5 mg 45 mg 45 mg 45 mg 45 mg   INR 1.1 1.0 1.2 1.5 1.7 2.0 2.1 1.9 2.1 2.2 2.4 2.6   Notes          rec 8/8       Date 9/2 9/9 9/16 9/23 9/30 10/7 10/14 10/21 11/1 11/15 11/29 12/13   Total Weekly Dose 45 mg 45 mg 47.5 mg 45 mg 47.5 mg 50 mg 50 mg 50mg 55 mg 55 mg 55 mg 55 mg   INR 2.0 1.8 2.3 1.7 1.9 1.8 1.9 1.7 2.0 2.1 2.6 2.6   Notes   1 boost              Date 12/27 1/10/23 1/20/23 2/2        Total Weekly Dose 55mg 55mg 52.5 mg 50 mg        INR 3.7 3.1 3.4 2.5        Notes    rcv'd 2/3          Phone Interview:  Tablet Strength: 5mg  Patient Contact Info: [phone] (home- preferred)  back up phone [phone] (mobile) may speak to Skip Webster (son)  Estimated OOP cost: [please send to registration if not already done]  Verbal Release Authorization - Received 7/28/22  Lab Contact Info: [facility]        Plan:  1. INR is therapeutic today at 2.5. Instructed Mr. Webster to reduce to warfarin 5 mg on Fridays and all other days warfarin 7.5 mg until recheck. 50 mg/week (4.8% decrease for weekly dose).  2. Repeat INR 2 weeks on 2/17/23  3. Verbal information provided over the phone to Zack Webster. He expresses understanding by teach back, RBV dosing instructions, and has no further questions at this time.      Janice Goldman,  PharmD  Pharmacy Resident  02/03/23  15:06 EST

## 2023-02-08 ENCOUNTER — OFFICE VISIT (OUTPATIENT)
Dept: CARDIOLOGY | Facility: CLINIC | Age: 83
End: 2023-02-08
Payer: MEDICARE

## 2023-02-08 VITALS
WEIGHT: 197 LBS | OXYGEN SATURATION: 97 % | BODY MASS INDEX: 28.2 KG/M2 | SYSTOLIC BLOOD PRESSURE: 146 MMHG | DIASTOLIC BLOOD PRESSURE: 70 MMHG | HEART RATE: 78 BPM | HEIGHT: 70 IN

## 2023-02-08 DIAGNOSIS — I10 ESSENTIAL HYPERTENSION: ICD-10-CM

## 2023-02-08 DIAGNOSIS — I50.22 CHRONIC SYSTOLIC CONGESTIVE HEART FAILURE: ICD-10-CM

## 2023-02-08 DIAGNOSIS — I35.0 AORTIC STENOSIS, SEVERE: Primary | ICD-10-CM

## 2023-02-08 DIAGNOSIS — E78.2 MIXED HYPERLIPIDEMIA: ICD-10-CM

## 2023-02-08 PROCEDURE — 99214 OFFICE O/P EST MOD 30 MIN: CPT | Performed by: INTERNAL MEDICINE

## 2023-02-08 RX ORDER — METOPROLOL SUCCINATE 25 MG/1
12.5 TABLET, EXTENDED RELEASE ORAL DAILY
Qty: 60 TABLET | Refills: 2 | Status: SHIPPED | OUTPATIENT
Start: 2023-02-08

## 2023-02-08 NOTE — PROGRESS NOTES
[facility] Cardiology    Patient ID: Zack Webster is a 82 y.o. male.  : 1940   Contact: [phone]    Encounter date: 2023    PCP: Aspen Rust MD      Chief complaint:   Chief Complaint   Patient presents with   • Aortic stenosis, severe       Problem List:  1. Aortic stenosis  a. Echo 2021, STJ: EF 35-40%, grade II diastolic dysfunction, moderate to severe MR, mild AI, moderate AS (mean PG 28.08 mmHg, peak 44.64 mmHg), moderate TR, RVSP 64.85 mmHg.   b. Cardiac cath, 2022, Noncritical LAD and RCA disease. Borderline stenosis in a moderate second diagonal branch estimated at 60% in artery barely 2 mm in diameter. 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 A a JL 5 was used for the left coronary circulation.  c. ALVINO, 2022; EF 25%. The left ventricular cavity is mildly dilated. Moderate to severe AS. Severe AS is suspected. Aortic annulus 2.7 cm. Peak velocity of the flow distal to the aortic valve is 364 cm/s. Aortic valve mean pressure gradient is 32 mmHg. Moderate to severe MR.   d. Carotid duplex, 2022; 0-49% stenosis of the CHRISTIANO and LICA. Bilateral heterogeneous carotid atherosclerosis greater on the left than the right.  e. Stress echo, 2022; EF 25%. The patient's heart rate increased from 62 bpm to only 70 bpm with the dobutamine infusion. The echocardiogram following dobutamine dosing showed a maximal velocity of 401 cm/s with a mean gradient of 40.5 mmHg. Calculated aortic valve area 0.7 cm². The stroke-volume increased from 56 to 75 mL. The aortic valve gradient remained unchanged. Based on the above criteria the patient qualifies for TAVR.  f. ALVINO, 2022; EF 25%. Global function severely impaired. Mild concentric LVH. Following placement of a 29 mm JUDI 3 pericardial prosthesis: No paravalvular leak is seen. Mean aortic valve gradient 6 mmHg. Calculated aortic valve area 1.92  cm². Moderate to severe MR. Moderate dilation of the ascending aorta.  g. TAVR, 05/16/2022; Successful transcatheter aortic valve replacement with 29-2 mm  Melgar LIBAN 3 pericardial prosthesis. No acute procedure-related complications.  h. Echocardiogram, 06/22/2022: EF 25-30%, LV mild to moderately dilated, mild LVH, 29-2 Liban 3 TAVR present with mean PG of 15.7 mmHg (6 mmHg immediately post-op), RADHA 1.37 cm2, moderate MR, mild aortic root dilation (4.4 cm, 2.2 after correcting for BSA).   i. Echocardiogram, 10/05/2022: EF 25-30%. 29 mm Liban 3 pericardial prosthesis in the aortic position. Mean aortic valve gradient 15.3 mmHg.   j. Echocardiogram, 01/06/2023; EF 25-30%. 29 mm Liban prosthesis in the aortic position. AV mean PG 14 mmHg. Repeat in 3 months -  No change we will stop Warfarin and follow.   2. Systolic heart failure  a. Echo 4/2021, STJ: EF 35-40%, grade II diastolic dysfunction, moderate to severe MR, mild AI, moderate AS (mean PG 28.08 mmHg, peak 44.64 mmHg), moderate TR, RVSP 64.85 mmHg.   3. Hypertension  4. Hyperlipidemia  5. Hypothyroidism  6. GERD  7. Diabetes  8. Depression    Allergies   Allergen Reactions   • Levaquin [Levofloxacin] Swelling   • Doxycycline Other (See Comments)     [facility]       Current Medications:    Current Outpatient Medications:   •  aspirin 81 MG EC tablet, Take 1 tablet by mouth Daily., Disp: 30 tablet, Rfl: 11  •  atorvastatin (LIPITOR) 80 MG tablet, Take 1 tablet by mouth Daily., Disp: 90 tablet, Rfl: 0  •  docusate sodium (COLACE) 100 MG capsule, Take 100 mg by mouth 2 (Two) Times a Day As Needed., Disp: , Rfl:   •  Januvia 100 MG tablet, Take 100 mg by mouth Daily., Disp: , Rfl:   •  levothyroxine (SYNTHROID, LEVOTHROID) 75 MCG tablet, Take 75 mcg by mouth Daily., Disp: , Rfl:   •  metoprolol succinate XL (Toprol XL) 25 MG 24 hr tablet, Take 0.5 tablets by mouth Daily., Disp: 60 tablet, Rfl: 4  •  omeprazole (priLOSEC) 40 MG capsule, Take 40 mg by mouth Daily  "As Needed., Disp: , Rfl:   •  potassium chloride 10 MEQ CR tablet, Take 10 mEq by mouth As Needed., Disp: , Rfl:   •  sacubitril-valsartan (Entresto) 49-51 MG tablet, Take 1 tablet by mouth 2 (Two) Times a Day., Disp: 180 tablet, Rfl: 3  •  torsemide (DEMADEX) 20 MG tablet, Take 20 mg by mouth Daily As Needed., Disp: , Rfl:   •  warfarin (Coumadin) 5 MG tablet, Take 1 tablet SunMonWedFri and 1.5 tablets TueThurSat by mouth or as directed by the anticoagulation clinic, Disp: 40 tablet, Rfl: 0  •  warfarin (COUMADIN) 5 MG tablet, Take 1.5-2 tablets by mouth once daily or as directed by the anticoagulation clinic, Disp: 135 tablet, Rfl: 0  •  pantoprazole (PROTONIX) 40 MG EC tablet, Take 40 mg by mouth Daily., Disp: , Rfl:     HPI    Zack Webster is a 82 y.o. male who presents today for three month follow up of aortic stenosis, systolic congestive heart failure, and cardiac risk factors. Since last visit, the patient has been doing well overall from a cardiovascular standpoint. He states he is getting recurring nose bleeds. He does complain of occasional dyspnea on exertion and dizziness. He states he does not have a regular routine for exercise but does believe that he can initiate one by walking around his house in the colder months until the weather begins to warm up. Patient denies chest pain, orthopnea, palpitations, edema, and syncope.       The following portions of the patient's history were reviewed and updated as appropriate: allergies, current medications and problem list.    Pertinent positives as listed in the HPI.  All other systems reviewed are negative.         Vitals:    02/08/23 1052   BP: 146/70   BP Location: Left arm   Patient Position: Sitting   Pulse: 78   SpO2: 97%   Weight: 89.4 kg (197 lb)   Height: 177.8 cm (70\")       Physical Exam:  General: Alert and oriented.  Neck: Jugular venous pressure is within normal limits. Carotids have normal upstrokes without bruits.   Cardiovascular: Heart has a " nondisplaced focal PMI. Regular rate and rhythm. No gallop or rub. 1/6 JONATHAN with radiation to R carotid.   Lungs: Clear, no rales or wheezes. Equal expansion is noted.   Extremities: Trace TOOTIE, bilaterally.   Skin: Warm and dry.  Neurologic: Nonfocal.     Diagnostic Data (reviewed with patient):  Lab Results   Component Value Date    GLUCOSE 197 (H) 05/17/2022    BUN 25 (H) 05/17/2022    CREATININE 0.95 05/17/2022    BCR 26.3 (H) 05/17/2022     05/17/2022    K 4.0 05/17/2022     (H) 05/17/2022    CO2 19.0 (L) 05/17/2022    CALCIUM 7.6 (L) 05/17/2022    ALBUMIN 3.90 05/13/2022    ALKPHOS 42 05/13/2022    AST 9 05/13/2022    ALT 9 05/13/2022     Lab Results   Component Value Date    CHOL 199 04/08/2022    TRIG 63 04/08/2022    HDL 54 04/08/2022     (H) 04/08/2022      Lab Results   Component Value Date    WBC 7.61 05/17/2022    RBC 3.53 (L) 05/17/2022    HGB 10.9 (L) 05/17/2022    HCT 32.3 (L) 05/17/2022    MCV 91.5 05/17/2022     05/17/2022        Procedures      Assessment:    ICD-10-CM ICD-9-CM   1. Aortic stenosis, severe  I35.0 424.1   2. Chronic systolic congestive heart failure (HCC)  I50.22 428.22     428.0   3. Essential hypertension  I10 401.9   4. Mixed hyperlipidemia  E78.2 272.2         Plan:  1. Echocardiogram ordered and to be completed around 04/2023 with same day F/U to reassess aortic valve gradients. If echocardiogram and AV mean PG is same as before on (01/06/2023), we will discuss discontinuing warfarin.    2. Begin routine aerobic exercise for at least 30 minutes 5 days per week.    3. Continue on warfarin 5 mg daily for presumed thrombus on aortic valve.   4. Continue on atorvastatin 80 mg daily for hyperlipidemia.     5. Continue on aspirin 81 mg for antiplatelet therapy.   6. Continue on Entresto 49-51 mg BID and metoprolol 12.5 mg daily for hypertension.   7. Continue on torsemide 20 mg daily for fluid retention.    8. Continue all other current medications.  9. F/up  in 2 months, sooner if needed.      Scribed for Adwoa Reynoso MD by Zelda Goldman. 2/8/2023 11:08 EST         I Adwoa Reynoso MD personally performed the services described in this documentation as scribed by the above individual in my presence, and it is both accurate and complete.    Adwoa Reynoso MD, FACC

## 2023-02-20 ENCOUNTER — ANTICOAGULATION VISIT (OUTPATIENT)
Dept: PHARMACY | Facility: HOSPITAL | Age: 83
End: 2023-02-20
Payer: MEDICARE

## 2023-02-20 DIAGNOSIS — I82.90 THROMBUS: Primary | ICD-10-CM

## 2023-02-20 LAB — INR PPP: 2.5

## 2023-02-20 NOTE — PROGRESS NOTES
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  Goal INR: 2-3  Current Drug Interactions: levothyroxine and ASA    Bleed Risk: bleeding stomach ulcers x 2 occassions 1970 and 1973.     Diet: green beans and tossed salad; avoids 1/10/23  Alcohol: None  Tobacco: None  OTC Pain Medication: APAP    INR History:  Date 6/22 6/27 6/30 7/5 7/8 7/12 7/15 7/22 7/29 8/5 8/12 8/26   Total Weekly Dose 0mg 0mg 17.5mg 35mg 37.5mg 42.5mg 42.5 mg 42.5 mg 45 mg 45 mg 45 mg 45 mg   INR 1.1 1.0 1.2 1.5 1.7 2.0 2.1 1.9 2.1 2.2 2.4 2.6   Notes          rec 8/8       Date 9/2 9/9 9/16 9/23 9/30 10/7 10/14 10/21 11/1 11/15 11/29 12/13   Total Weekly Dose 45 mg 45 mg 47.5 mg 45 mg 47.5 mg 50 mg 50 mg 50mg 55 mg 55 mg 55 mg 55 mg   INR 2.0 1.8 2.3 1.7 1.9 1.8 1.9 1.7 2.0 2.1 2.6 2.6   Notes   1 boost              Date 12/27 1/10/23 1/20/23 2/2 2/17       Total Weekly Dose 55mg 55mg 52.5 mg 50 mg 50mg       INR 3.7 3.1 3.4 2.5 2.5       Notes    rcv'd 2/3 rec 2/20         Phone Interview:  Tablet Strength: 5mg  Patient Contact Info: [phone] (home- preferred)  back up phone [phone] (mobile) may speak to Skip Webster (son)  Estimated OOP cost: [please send to registration if not already done]  Verbal Release Authorization - Received 7/28/22  Lab Contact Info: [facility]    Negatives:  Signs/symptoms of thrombosis, Signs/symptoms of bleeding, Laboratory test error suspected, Change in health, Change in alcohol use, Change in activity, Upcoming invasive procedure, Emergency department visit, Upcoming dental procedure, Missed doses, Extra doses, Change in medications, Change in diet/appetite, Hospital admission, Bruising, Other complaints   Comments:  Per Dr Teague most recent note  "\"Echocardiogram ordered and to be completed around 04/2023 with same day F/U to reassess aortic valve gradients. If echocardiogram and AV mean PG is same as before on (01/06/2023), we will discuss discontinuing warfarin.\"         Plan:  1. INR is therapeutic today at 2.5. Instructed Mr. Webster to continue warfarin 5 mg on Fridays and all other days warfarin 7.5 mg until recheck. 50 mg/week   2. Repeat INR 3 weeks  3. Verbal information provided over the phone to Zack Webster. He expresses understanding by teach back, RBV dosing instructions, and has no further questions at this time.  4. Per Dr Nova most recent note \"Echocardiogram ordered and to be completed around 04/2023 with same day F/U to reassess aortic valve gradients. If echocardiogram and AV mean PG is same as before on (01/06/2023), we will discuss discontinuing warfarin.\"    Chinyere Dunn, JaniceD.  02/20/23   12:20 EST        "

## 2023-02-27 RX ORDER — ATORVASTATIN CALCIUM 80 MG/1
80 TABLET, FILM COATED ORAL DAILY
Qty: 90 TABLET | Refills: 0 | Status: SHIPPED | OUTPATIENT
Start: 2023-02-27

## 2023-03-09 DIAGNOSIS — I82.90 THROMBUS: ICD-10-CM

## 2023-03-09 RX ORDER — WARFARIN SODIUM 5 MG/1
TABLET ORAL
Qty: 135 TABLET | Refills: 0 | Status: SHIPPED | OUTPATIENT
Start: 2023-03-09

## 2023-03-13 ENCOUNTER — ANTICOAGULATION VISIT (OUTPATIENT)
Dept: PHARMACY | Facility: HOSPITAL | Age: 83
End: 2023-03-13
Payer: MEDICARE

## 2023-03-13 DIAGNOSIS — I82.90 THROMBUS: Primary | ICD-10-CM

## 2023-03-13 LAB — INR PPP: 2.5

## 2023-03-13 NOTE — PROGRESS NOTES
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  Goal INR: 2-3  Current Drug Interactions: levothyroxine and ASA    Bleed Risk: bleeding stomach ulcers x 2 occassions 1970 and 1973.     Diet: green beans and tossed salad; avoids 1/10/23  Alcohol: None  Tobacco: None  OTC Pain Medication: APAP    INR History:  Date 6/22 6/27 6/30 7/5 7/8 7/12 7/15 7/22 7/29 8/5 8/12 8/26   Total Weekly Dose 0mg 0mg 17.5mg 35mg 37.5mg 42.5mg 42.5 mg 42.5 mg 45 mg 45 mg 45 mg 45 mg   INR 1.1 1.0 1.2 1.5 1.7 2.0 2.1 1.9 2.1 2.2 2.4 2.6   Notes          rec 8/8       Date 9/2 9/9 9/16 9/23 9/30 10/7 10/14 10/21 11/1 11/15 11/29 12/13   Total Weekly Dose 45 mg 45 mg 47.5 mg 45 mg 47.5 mg 50 mg 50 mg 50mg 55 mg 55 mg 55 mg 55 mg   INR 2.0 1.8 2.3 1.7 1.9 1.8 1.9 1.7 2.0 2.1 2.6 2.6   Notes   1 boost              Date 12/27 1/10/23 1/20/23 2/2 2/17 3/10/23      Total Weekly Dose 55mg 55mg 52.5 mg 50 mg 50mg 50 mg      INR 3.7 3.1 3.4 2.5 2.5 2.5      Notes    rcv'd 2/3 rec 2/20 rec 03/13        Phone Interview:  Tablet Strength: 5mg  Patient Contact Info: [phone] (home- preferred)  back up phone [phone] (mobile) may speak to Skip Webster (son)  Estimated OOP cost: [please send to registration if not already done]  Verbal Release Authorization - Received 7/28/22  Lab Contact Info: [facility]      Patient Findings  Negatives:  Signs/symptoms of thrombosis, Signs/symptoms of bleeding, Laboratory test error suspected, Change in health, Change in alcohol use, Change in activity, Upcoming invasive procedure, Emergency department visit, Upcoming dental procedure, Missed doses, Extra doses, Change in medications, Change in diet/appetite, Hospital admission, Bruising, Other complaints  "  Comments:  All findings negative per patient     Appointment with Adwoa Reynoso scheduled on April 19th       Plan:  1. INR is therapeutic today at 2.5. Instructed Mr. Webster to continue warfarin 7.5 mg oral daily except warfarin 5 mg oral on fridays until recheck. 50 mg/week   2. Repeat INR 3/31/2023 ( 3 weeks from previous testing day )  3. Verbal information provided over the phone to Zack Webster. He expresses understanding by teach back, RBV dosing instructions, and has no further questions at this time.  4. Per Dr Nova most recent note \"Echocardiogram ordered and to be completed 04/19/2023 with same day F/U to reassess aortic valve gradients. If echocardiogram and AV mean PG is same as before on (01/06/2023), we will discuss discontinuing warfarin.\"    Nick Mccauley, Pharmacy Technician  3/13/2023  14:01 EDT      I, Claritza Swann, PharmD, have reviewed the note in full and agree with the assessment and plan.  03/13/23  14:48 EDT  "

## 2023-03-31 ENCOUNTER — ANTICOAGULATION VISIT (OUTPATIENT)
Dept: PHARMACY | Facility: HOSPITAL | Age: 83
End: 2023-03-31
Payer: MEDICARE

## 2023-03-31 DIAGNOSIS — I82.90 THROMBUS: Primary | ICD-10-CM

## 2023-03-31 LAB — INR PPP: 2.4

## 2023-03-31 NOTE — PROGRESS NOTES
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  Goal INR: 2-3  Current Drug Interactions: levothyroxine and ASA    Bleed Risk: bleeding stomach ulcers x 2 occassions 1970 and 1973.     Diet: green beans and tossed salad; avoids 1/10/23  Alcohol: None  Tobacco: None  OTC Pain Medication: APAP    INR History:  Date 6/22 6/27 6/30 7/5 7/8 7/12 7/15 7/22 7/29 8/5 8/12 8/26   Total Weekly Dose 0mg 0mg 17.5mg 35mg 37.5mg 42.5mg 42.5 mg 42.5 mg 45 mg 45 mg 45 mg 45 mg   INR 1.1 1.0 1.2 1.5 1.7 2.0 2.1 1.9 2.1 2.2 2.4 2.6   Notes          rec 8/8       Date 9/2 9/9 9/16 9/23 9/30 10/7 10/14 10/21 11/1 11/15 11/29 12/13   Total Weekly Dose 45 mg 45 mg 47.5 mg 45 mg 47.5 mg 50 mg 50 mg 50mg 55 mg 55 mg 55 mg 55 mg   INR 2.0 1.8 2.3 1.7 1.9 1.8 1.9 1.7 2.0 2.1 2.6 2.6   Notes   1 boost              Date 12/27 1/10/23 1/20/23 2/2 2/17 3/10/23 3/31/23     Total Weekly Dose 55mg 55mg 52.5 mg 50 mg 50mg 50 mg 50 mg      INR 3.7 3.1 3.4 2.5 2.5 2.5 2.4     Notes    rcv'd 2/3 rec 2/20 rec 03/13        Phone Interview:  Tablet Strength: 5mg  Patient Contact Info: [phone] (home- preferred)  back up phone [phone] (mobile) may speak to Skip Webster (son)  Estimated OOP cost: [please send to registration if not already done]  Verbal Release Authorization - Received 7/28/22  Lab Contact Info: [facility]      Patient Findings  Negatives:  Signs/symptoms of thrombosis, Signs/symptoms of bleeding, Laboratory test error suspected, Change in health, Change in alcohol use, Change in activity, Upcoming invasive procedure, Emergency department visit, Upcoming dental procedure, Missed doses, Extra doses, Change in medications, Change in diet/appetite, Hospital admission, Bruising, Other  "complaints   Comments:  All findings negative per patient     Appointment with Adwoa Reynoso scheduled on April 19th         Plan:  1. INR is therapeutic today at 2.4. Instructed Mr. Webster to continue warfarin 7.5 mg oral daily except warfarin 5 mg oral on fridays until recheck. 50 mg/week   2. Repeat INR in 3 weeks 4/21/2023   3. Verbal information provided over the phone to Zack Webster. He expresses understanding by teach back, RBV dosing instructions, and has no further questions at this time.  4. Per Dr Nova most recent note \"Echocardiogram ordered and to be completed 04/19/2023 with same day F/U to reassess aortic valve gradients. If echocardiogram and AV mean PG is same as before on (01/06/2023), we will discuss discontinuing warfarin.\"    Nick Mccauley, Pharmacy Technician  3/31/2023  15:37 EDT    I, Claritza Swann, PharmD, have reviewed the note in full and agree with the assessment and plan.  03/31/23  15:53 EDT  "

## 2023-04-03 RX ORDER — SACUBITRIL AND VALSARTAN 49; 51 MG/1; MG/1
TABLET, FILM COATED ORAL
Qty: 180 TABLET | Refills: 0 | Status: SHIPPED | OUTPATIENT
Start: 2023-04-03 | End: 2023-04-19 | Stop reason: DRUGHIGH

## 2023-04-19 ENCOUNTER — HOSPITAL ENCOUNTER (OUTPATIENT)
Dept: CARDIOLOGY | Facility: HOSPITAL | Age: 83
Discharge: HOME | End: 2023-04-19
Admitting: INTERNAL MEDICINE
Payer: MEDICARE

## 2023-04-19 ENCOUNTER — OFFICE VISIT (OUTPATIENT)
Dept: CARDIOLOGY | Facility: CLINIC | Age: 83
End: 2023-04-19
Payer: MEDICARE

## 2023-04-19 VITALS
WEIGHT: 201.2 LBS | BODY MASS INDEX: 28.8 KG/M2 | OXYGEN SATURATION: 98 % | HEIGHT: 70 IN | DIASTOLIC BLOOD PRESSURE: 70 MMHG | HEART RATE: 58 BPM | SYSTOLIC BLOOD PRESSURE: 142 MMHG

## 2023-04-19 VITALS — WEIGHT: 197 LBS | BODY MASS INDEX: 28.2 KG/M2 | HEIGHT: 70 IN

## 2023-04-19 DIAGNOSIS — I35.0 AORTIC STENOSIS, SEVERE: ICD-10-CM

## 2023-04-19 DIAGNOSIS — I50.22 CHRONIC SYSTOLIC CONGESTIVE HEART FAILURE: ICD-10-CM

## 2023-04-19 DIAGNOSIS — I10 ESSENTIAL HYPERTENSION: ICD-10-CM

## 2023-04-19 DIAGNOSIS — E78.2 MIXED HYPERLIPIDEMIA: ICD-10-CM

## 2023-04-19 DIAGNOSIS — I35.0 AORTIC STENOSIS, SEVERE: Primary | ICD-10-CM

## 2023-04-19 LAB
BH CV ECHO MEAS - AO MAX PG: 28 MMHG
BH CV ECHO MEAS - AO MEAN PG: 15 MMHG
BH CV ECHO MEAS - AO ROOT DIAM: 2.8 CM
BH CV ECHO MEAS - AO V2 MAX: 264.5 CM/SEC
BH CV ECHO MEAS - AO V2 VTI: 56.8 CM
BH CV ECHO MEAS - AVA(I,D): 1.3 CM2
BH CV ECHO MEAS - EDV(CUBED): 125 ML
BH CV ECHO MEAS - EDV(MOD-SP2): 229 ML
BH CV ECHO MEAS - EDV(MOD-SP4): 234 ML
BH CV ECHO MEAS - EF(MOD-BP): 36.1 %
BH CV ECHO MEAS - EF(MOD-SP2): 41.5 %
BH CV ECHO MEAS - EF(MOD-SP4): 32.1 %
BH CV ECHO MEAS - ESV(CUBED): 79.5 ML
BH CV ECHO MEAS - ESV(MOD-SP2): 134 ML
BH CV ECHO MEAS - ESV(MOD-SP4): 159 ML
BH CV ECHO MEAS - FS: 14 %
BH CV ECHO MEAS - IVS/LVPW: 1 CM
BH CV ECHO MEAS - IVSD: 1.4 CM
BH CV ECHO MEAS - LA DIMENSION: 3.4 CM
BH CV ECHO MEAS - LV DIASTOLIC VOL/BSA (35-75): 112.8 CM2
BH CV ECHO MEAS - LV MASS(C)D: 291.4 GRAMS
BH CV ECHO MEAS - LV MAX PG: 2.8 MMHG
BH CV ECHO MEAS - LV MEAN PG: 1.5 MMHG
BH CV ECHO MEAS - LV SYSTOLIC VOL/BSA (12-30): 76.7 CM2
BH CV ECHO MEAS - LV V1 MAX: 83.1 CM/SEC
BH CV ECHO MEAS - LV V1 VTI: 20.1 CM
BH CV ECHO MEAS - LVIDD: 5 CM
BH CV ECHO MEAS - LVIDS: 4.3 CM
BH CV ECHO MEAS - LVOT AREA: 3.1 CM2
BH CV ECHO MEAS - LVOT DIAM: 2.3 CM
BH CV ECHO MEAS - LVPWD: 1.4 CM
BH CV ECHO MEAS - MV MAX PG: 6.2 MMHG
BH CV ECHO MEAS - MV MEAN PG: 2 MMHG
BH CV ECHO MEAS - MV V2 VTI: 35.4 CM
BH CV ECHO MEAS - MVA(VTI): 1.56 CM2
BH CV ECHO MEAS - SI(MOD-SP2): 45.8 ML/M2
BH CV ECHO MEAS - SI(MOD-SP4): 36.2 ML/M2
BH CV ECHO MEAS - SV(LVOT): 55.1 ML
BH CV ECHO MEAS - SV(MOD-SP2): 95 ML
BH CV ECHO MEAS - SV(MOD-SP4): 75 ML
BH CV VAS BP LEFT ARM: NORMAL MMHG
LV EF 2D ECHO EST: 30 %
MAXIMAL PREDICTED HEART RATE: 137 BPM
STRESS TARGET HR: 116 BPM

## 2023-04-19 PROCEDURE — 93325 DOPPLER ECHO COLOR FLOW MAPG: CPT

## 2023-04-19 PROCEDURE — 25010000002 SULFUR HEXAFLUORIDE MICROSPH 60.7-25 MG RECONSTITUTED SUSPENSION: Performed by: INTERNAL MEDICINE

## 2023-04-19 PROCEDURE — 93308 TTE F-UP OR LMTD: CPT

## 2023-04-19 PROCEDURE — 93321 DOPPLER ECHO F-UP/LMTD STD: CPT

## 2023-04-19 RX ADMIN — SULFUR HEXAFLUORIDE 2 ML: KIT at 14:00

## 2023-04-19 NOTE — PROGRESS NOTES
[facility] Cardiology    Patient ID: Zack Webster is a 82 y.o. male.  : 1940   Contact: [phone]    Encounter date: 2023    PCP: Aspen Rust MD      Chief complaint:   Chief Complaint   Patient presents with   • Aortic stenosis, severe       Problem List:  1. Aortic stenosis  a. Echo 2021, STJ: EF 35-40%, grade II diastolic dysfunction, moderate to severe MR, mild AI, moderate AS (mean PG 28.08 mmHg, peak 44.64 mmHg), moderate TR, RVSP 64.85 mmHg.   b. Cardiac cath, 2022, Noncritical LAD and RCA disease. Borderline stenosis in a moderate second diagonal branch estimated at 60% in artery barely 2 mm in diameter. 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 A a JL 5 was used for the left coronary circulation.  c. ALVINO, 2022; EF 25%. The left ventricular cavity is mildly dilated. Moderate to severe AS. Severe AS is suspected. Aortic annulus 2.7 cm. Peak velocity of the flow distal to the aortic valve is 364 cm/s. Aortic valve mean pressure gradient is 32 mmHg. Moderate to severe MR.   d. Carotid duplex, 2022; 0-49% stenosis of the CHRISTIANO and LICA. Bilateral heterogeneous carotid atherosclerosis greater on the left than the right.  e. Stress echo, 2022; EF 25%. The patient's heart rate increased from 62 bpm to only 70 bpm with the dobutamine infusion. The echocardiogram following dobutamine dosing showed a maximal velocity of 401 cm/s with a mean gradient of 40.5 mmHg. Calculated aortic valve area 0.7 cm². The stroke-volume increased from 56 to 75 mL. The aortic valve gradient remained unchanged. Based on the above criteria the patient qualifies for TAVR.  f. ALVINO, 2022; EF 25%. Global function severely impaired. Mild concentric LVH. Following placement of a 29 mm JUDI 3 pericardial prosthesis: No paravalvular leak is seen. Mean aortic valve gradient 6 mmHg. Calculated aortic valve area 1.92  cm². Moderate to severe MR. Moderate dilation of the ascending aorta.  g. TAVR, 05/16/2022; Successful transcatheter aortic valve replacement with 29-2 mm  Melgar LIBAN 3 pericardial prosthesis. No acute procedure-related complications.  h. Echocardiogram, 06/22/2022: EF 25-30%, LV mild to moderately dilated, mild LVH, 29-2 Liban 3 TAVR present with mean PG of 15.7 mmHg (6 mmHg immediately post-op), RADHA 1.37 cm2, moderate MR, mild aortic root dilation (4.4 cm, 2.2 after correcting for BSA).   i. Echocardiogram, 10/05/2022: EF 25-30%. 29 mm Liban 3 pericardial prosthesis in the aortic position. Mean aortic valve gradient 15.3 mmHg.   j. Echocardiogram, 01/06/2023; EF 25-30%. 29 mm Liban prosthesis in the aortic position. AV mean PG 14 mmHg. Repeat in 3 months -  No change we will stop Warfarin and follow.   k. Echocardiogram, 04/19/2023; mean valve gradient 15 mmHg.  2. Systolic heart failure  a. Echo 4/2021, STJ: EF 35-40%, grade II diastolic dysfunction, moderate to severe MR, mild AI, moderate AS (mean PG 28.08 mmHg, peak 44.64 mmHg), moderate TR, RVSP 64.85 mmHg.   3. Hypertension  4. Hyperlipidemia  5. Hypothyroidism  6. GERD  7. Diabetes  8. Depression    Allergies   Allergen Reactions   • Doxycycline Other (See Comments)     Uknown   • Levaquin [Levofloxacin] Swelling       Current Medications:    Current Outpatient Medications:   •  aspirin 81 MG EC tablet, Take 1 tablet by mouth Daily., Disp: 30 tablet, Rfl: 11  •  atorvastatin (LIPITOR) 80 MG tablet, TAKE 1 TABLET BY MOUTH DAILY, Disp: 90 tablet, Rfl: 0  •  docusate sodium (COLACE) 100 MG capsule, Take 1 capsule by mouth 2 (Two) Times a Day As Needed., Disp: , Rfl:   •  Januvia 100 MG tablet, Take 1 tablet by mouth Daily., Disp: , Rfl:   •  levothyroxine (SYNTHROID, LEVOTHROID) 75 MCG tablet, Take 1 tablet by mouth Daily., Disp: , Rfl:   •  metoprolol succinate XL (Toprol XL) 25 MG 24 hr tablet, Take 0.5 tablets by mouth Daily., Disp: 60 tablet, Rfl:  "2  •  omeprazole (priLOSEC) 40 MG capsule, Take 1 capsule by mouth Daily As Needed., Disp: , Rfl:   •  pantoprazole (PROTONIX) 40 MG EC tablet, Take 1 tablet by mouth Daily., Disp: , Rfl:   •  potassium chloride 10 MEQ CR tablet, Take 1 tablet by mouth As Needed., Disp: , Rfl:   •  torsemide (DEMADEX) 20 MG tablet, Take 1 tablet by mouth Daily As Needed., Disp: , Rfl:   No current facility-administered medications for this visit.    HPI    Zack Webster is a 82 y.o. male who presents today for 2 month follow up of aortic stenosis, systolic congestive heart failure, and cardiac risk factors. Since last visit, patient does monitor their blood pressure with readings ranging around 140-150 mmHg systolic with a pulse of 68 bpm. He notes he does feel fatigued.  He states that his heart rate is frequently in the 50s and asks what in normal ranges.  He reports occasional lower extremity edema. Patient denies chest pain, shortness of breath, orthopnea, palpitations, dizziness, and syncope.      The following portions of the patient's history were reviewed and updated as appropriate: allergies, current medications and problem list.    Pertinent positives as listed in the HPI.  All other systems reviewed are negative.         Vitals:    04/19/23 1537   BP: 142/70   BP Location: Left arm   Patient Position: Sitting   Pulse: 58   SpO2: 98%   Weight: 91.3 kg (201 lb 3.2 oz)   Height: 177.8 cm (70\")       Physical Exam:  General: Alert and oriented.  Neck: Jugular venous pressure is within normal limits. Carotids have normal upstrokes without bruits.   Cardiovascular: Heart has a nondisplaced focal PMI. Regular rate and rhythm. No gallop or rub. Trace JONATHAN  Lungs: Clear, no rales or wheezes. Equal expansion is noted.   Extremities: Show no edema.  Skin: Warm and dry.  Neurologic: Nonfocal.     Diagnostic Data (reviewed with patient):  Lab Results   Component Value Date    GLUCOSE 197 (H) 05/17/2022    BUN 25 (H) 05/17/2022    " CREATININE 0.95 05/17/2022    EGFR 79.9 05/17/2022    BCR 26.3 (H) 05/17/2022     05/17/2022    K 4.0 05/17/2022     (H) 05/17/2022    CO2 19.0 (L) 05/17/2022    CALCIUM 7.6 (L) 05/17/2022    ALBUMIN 3.90 05/13/2022    ALKPHOS 42 05/13/2022    AST 9 05/13/2022    ALT 9 05/13/2022     Lab Results   Component Value Date    CHOL 199 04/08/2022    TRIG 63 04/08/2022    HDL 54 04/08/2022     (H) 04/08/2022      Lab Results   Component Value Date    WBC 7.61 05/17/2022    RBC 3.53 (L) 05/17/2022    HGB 10.9 (L) 05/17/2022    HCT 32.3 (L) 05/17/2022    MCV 91.5 05/17/2022     05/17/2022        Advance Care Planning   ACP discussion was held with the patient during this visit. Patient does not have an advance directive, declines further assistance.       Procedures      Assessment:    ICD-10-CM ICD-9-CM   1. Aortic stenosis, severe  I35.0 424.1   2. Chronic systolic congestive heart failure  I50.22 428.22     428.0   3. Essential hypertension  I10 401.9   4. Mixed hyperlipidemia  E78.2 272.2         Plan:  1. Increase Entresto to  mg BID to better control blood pressure.   2. Patient was encouraged to continue to monitor heart rate. If patient is still having low heart rates, he may contact our office and we will discontinue metoprolol.   3. Patient may discontinue Warfarin as therapy is completed and prosthetic aortic valve gradient is stable.   4. Echocardiogram ordered and to be completed prior to next follow up to reassess heart and valve anatomy due to previous TAVR.    5. Continue on aspirin 81 mg daily for antiplatelet therapy.   6. Continue on atorvastatin 80 mg daily for hyperlipidemia.    7. Continue on metoprolol 12.5 mg daily for rate control and hypertension.  May consider reducing metoprolol in the future if he remains fatigued and bradycardic.  8. Continue on torsemide 20 mg and potassium daily PRN for fluid retention.    9. Continue all other current medications.  10. F/up in  6 months, sooner if needed.      Scribed for Adwoa Reynoso MD by Zelda Goldman. 4/19/2023 15:49 EDT         I Adwoa Reynoso MD personally performed the services described in this documentation as scribed by the above individual in my presence, and it is both accurate and complete.    Adwoa Reynoso MD, FACC

## 2023-05-04 ENCOUNTER — TELEPHONE (OUTPATIENT)
Dept: PHARMACY | Facility: HOSPITAL | Age: 83
End: 2023-05-04

## 2023-05-05 ENCOUNTER — TELEPHONE (OUTPATIENT)
Dept: PHARMACY | Facility: HOSPITAL | Age: 83
End: 2023-05-05

## 2023-05-05 NOTE — TELEPHONE ENCOUNTER
"Pt has d/c warfarin per Dr Reynoso visit 4/19/23.  \"Patient may discontinue Warfarin as therapy is completed and prosthetic aortic valve gradient is stable\"  Spoke to Mr Darin, he verified he has stopped warfarin therapy and is doing well.   "

## 2023-05-22 RX ORDER — ATORVASTATIN CALCIUM 80 MG/1
TABLET, FILM COATED ORAL
Qty: 90 TABLET | Refills: 3 | Status: SHIPPED | OUTPATIENT
Start: 2023-05-22

## 2023-12-20 ENCOUNTER — HOSPITAL ENCOUNTER (OUTPATIENT)
Dept: CARDIOLOGY | Facility: HOSPITAL | Age: 83
Discharge: HOME | End: 2023-12-20
Admitting: INTERNAL MEDICINE
Payer: MEDICARE

## 2023-12-20 ENCOUNTER — OFFICE VISIT (OUTPATIENT)
Dept: CARDIOLOGY | Facility: CLINIC | Age: 83
End: 2023-12-20
Payer: MEDICARE

## 2023-12-20 VITALS
DIASTOLIC BLOOD PRESSURE: 62 MMHG | HEIGHT: 70 IN | HEART RATE: 59 BPM | SYSTOLIC BLOOD PRESSURE: 118 MMHG | BODY MASS INDEX: 26.63 KG/M2 | WEIGHT: 186 LBS | OXYGEN SATURATION: 98 %

## 2023-12-20 VITALS — BODY MASS INDEX: 28.77 KG/M2 | WEIGHT: 201 LBS | HEIGHT: 70 IN

## 2023-12-20 DIAGNOSIS — I35.0 AORTIC STENOSIS, SEVERE: ICD-10-CM

## 2023-12-20 DIAGNOSIS — I35.9 AORTIC VALVE DISORDER: Primary | ICD-10-CM

## 2023-12-20 DIAGNOSIS — I10 ESSENTIAL HYPERTENSION: ICD-10-CM

## 2023-12-20 DIAGNOSIS — I50.22 CHRONIC SYSTOLIC CONGESTIVE HEART FAILURE: ICD-10-CM

## 2023-12-20 DIAGNOSIS — E78.2 MIXED HYPERLIPIDEMIA: ICD-10-CM

## 2023-12-20 DIAGNOSIS — Z95.3 S/P TAVR (TRANSCATHETER AORTIC VALVE REPLACEMENT), BIOPROSTHETIC: ICD-10-CM

## 2023-12-20 DIAGNOSIS — I50.22 CHRONIC SYSTOLIC CONGESTIVE HEART FAILURE: Primary | ICD-10-CM

## 2023-12-20 LAB
ASCENDING AORTA: 4.4 CM
BH CV ECHO MEAS - AO MAX PG: 26.9 MMHG
BH CV ECHO MEAS - AO MEAN PG: 15 MMHG
BH CV ECHO MEAS - AO ROOT AREA (BSA CORRECTED): 1.7 CM2
BH CV ECHO MEAS - AO ROOT DIAM: 3.7 CM
BH CV ECHO MEAS - AO V2 MAX: 259 CM/SEC
BH CV ECHO MEAS - AO V2 VTI: 55.6 CM
BH CV ECHO MEAS - AVA(I,D): 1.52 CM2
BH CV ECHO MEAS - EDV(CUBED): 205.4 ML
BH CV ECHO MEAS - EDV(MOD-SP2): 142 ML
BH CV ECHO MEAS - EDV(MOD-SP4): 128 ML
BH CV ECHO MEAS - EF(MOD-BP): 40.4 %
BH CV ECHO MEAS - EF(MOD-SP2): 41.7 %
BH CV ECHO MEAS - EF(MOD-SP4): 37.3 %
BH CV ECHO MEAS - ESV(CUBED): 91.1 ML
BH CV ECHO MEAS - ESV(MOD-SP2): 82.8 ML
BH CV ECHO MEAS - ESV(MOD-SP4): 80.3 ML
BH CV ECHO MEAS - FS: 23.7 %
BH CV ECHO MEAS - IVS/LVPW: 1 CM
BH CV ECHO MEAS - IVSD: 1 CM
BH CV ECHO MEAS - LV DIASTOLIC VOL/BSA (35-75): 61.2 CM2
BH CV ECHO MEAS - LV MASS(C)D: 239.9 GRAMS
BH CV ECHO MEAS - LV MAX PG: 4.1 MMHG
BH CV ECHO MEAS - LV MEAN PG: 2 MMHG
BH CV ECHO MEAS - LV SYSTOLIC VOL/BSA (12-30): 38.4 CM2
BH CV ECHO MEAS - LV V1 MAX: 101.1 CM/SEC
BH CV ECHO MEAS - LV V1 VTI: 20.4 CM
BH CV ECHO MEAS - LVIDD: 5.6 CM
BH CV ECHO MEAS - LVIDS: 4.5 CM
BH CV ECHO MEAS - LVOT AREA: 4.2 CM2
BH CV ECHO MEAS - LVOT DIAM: 2.3 CM
BH CV ECHO MEAS - LVPWD: 1 CM
BH CV ECHO MEAS - SI(MOD-SP2): 28.3 ML/M2
BH CV ECHO MEAS - SI(MOD-SP4): 22.8 ML/M2
BH CV ECHO MEAS - SV(LVOT): 84.8 ML
BH CV ECHO MEAS - SV(MOD-SP2): 59.2 ML
BH CV ECHO MEAS - SV(MOD-SP4): 47.7 ML
LV EF 2D ECHO EST: 30 %

## 2023-12-20 PROCEDURE — 93308 TTE F-UP OR LMTD: CPT

## 2023-12-20 PROCEDURE — 93325 DOPPLER ECHO COLOR FLOW MAPG: CPT

## 2023-12-20 PROCEDURE — 93321 DOPPLER ECHO F-UP/LMTD STD: CPT

## 2023-12-20 NOTE — PROGRESS NOTES
[facility] Cardiology    Patient ID: Zack Webster is a 82 y.o. male.  : 1940   Contact: [phone]    Encounter date: 2023    PCP: Aspen Rust MD      Chief complaint:   Chief Complaint   Patient presents with    AS    Congestive Heart Failure    Hypertension       Problem List:  Aortic stenosis  Echo 2021, STJ: EF 35-40%, grade II diastolic dysfunction, moderate to severe MR, mild AI, moderate AS (mean PG 28.08 mmHg, peak 44.64 mmHg), moderate TR, RVSP 64.85 mmHg.   Cardiac cath, 2022, Noncritical LAD and RCA disease. Borderline stenosis in a moderate second diagonal branch estimated at 60% in artery barely 2 mm in diameter. 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 A a JL 5 was used for the left coronary circulation.  ALVINO, 2022; EF 25%. The left ventricular cavity is mildly dilated. Moderate to severe AS. Severe AS is suspected. Aortic annulus 2.7 cm. Peak velocity of the flow distal to the aortic valve is 364 cm/s. Aortic valve mean pressure gradient is 32 mmHg. Moderate to severe MR.   Carotid duplex, 2022; 0-49% stenosis of the CHRISTIANO and LICA. Bilateral heterogeneous carotid atherosclerosis greater on the left than the right.  Stress echo, 2022; EF 25%. The patient's heart rate increased from 62 bpm to only 70 bpm with the dobutamine infusion. The echocardiogram following dobutamine dosing showed a maximal velocity of 401 cm/s with a mean gradient of 40.5 mmHg. Calculated aortic valve area 0.7 cm². The stroke-volume increased from 56 to 75 mL. The aortic valve gradient remained unchanged. Based on the above criteria the patient qualifies for TAVR.  ALVINO, 2022; EF 25%. Global function severely impaired. Mild concentric LVH. Following placement of a 29 mm JUDI 3 pericardial prosthesis: No paravalvular leak is seen. Mean aortic valve gradient 6 mmHg. Calculated aortic valve area 1.92  cm². Moderate to severe MR. Moderate dilation of the ascending aorta.  TAVR, 05/16/2022; Successful transcatheter aortic valve replacement with 29-2 mm  Melgar LIBAN 3 pericardial prosthesis. No acute procedure-related complications.  Echocardiogram, 06/22/2022: EF 25-30%, LV mild to moderately dilated, mild LVH, 29-2 Liban 3 TAVR present with mean PG of 15.7 mmHg (6 mmHg immediately post-op), RADHA 1.37 cm2, moderate MR, mild aortic root dilation (4.4 cm, 2.2 after correcting for BSA).   Echocardiogram, 10/05/2022: EF 25-30%. 29 mm Liban 3 pericardial prosthesis in the aortic position. Mean aortic valve gradient 15.3 mmHg.   Echocardiogram, 01/06/2023; EF 25-30%. 29 mm Liban prosthesis in the aortic position. AV mean PG 14 mmHg. Repeat in 3 months -  No change we will stop Warfarin and follow.   Echocardiogram, 04/19/2023; aortic valve mean valve gradient 15 mmHg.  Echo, 12/20/2023: LVEF estimated 30%.  29 mm LIBAN pericardial prosthesis present in the aortic position.  Mean aortic valve gradient 15 mmHg.  Mild to moderate MR.  Systolic heart failure  Echo 4/2021, STJ: EF 35-40%, grade II diastolic dysfunction, moderate to severe MR, mild AI, moderate AS (mean PG 28.08 mmHg, peak 44.64 mmHg), moderate TR, RVSP 64.85 mmHg.   Left bundle branch block  Hypertension  Hyperlipidemia  Hypothyroidism  GERD  Diabetes  Depression    Allergies   Allergen Reactions    Doxycycline Other (See Comments)     Uknown    Levaquin [Levofloxacin] Swelling       Current Medications:    Current Outpatient Medications:     aspirin 81 MG EC tablet, Take 1 tablet by mouth Daily., Disp: 30 tablet, Rfl: 11    atorvastatin (LIPITOR) 80 MG tablet, TAKE 1 TABLET BY MOUTH ONCE  DAILY, Disp: 90 tablet, Rfl: 3    docusate sodium (COLACE) 100 MG capsule, Take 1 capsule by mouth 2 (Two) Times a Day As Needed., Disp: , Rfl:     Januvia 100 MG tablet, Take 1 tablet by mouth Daily., Disp: , Rfl:     levothyroxine (SYNTHROID, LEVOTHROID) 75 MCG  "tablet, Take 1 tablet by mouth Daily., Disp: , Rfl:     metoprolol succinate XL (Toprol XL) 25 MG 24 hr tablet, Take 0.5 tablets by mouth Daily., Disp: 60 tablet, Rfl: 2    omeprazole (priLOSEC) 40 MG capsule, Take 1 capsule by mouth Daily As Needed., Disp: , Rfl:     potassium chloride 10 MEQ CR tablet, Take 1 tablet by mouth As Needed., Disp: , Rfl:     sacubitril-valsartan (ENTRESTO)  MG tablet, Take 1 tablet by mouth 2 (Two) Times a Day., Disp: 180 tablet, Rfl: 3    torsemide (DEMADEX) 20 MG tablet, Take 1 tablet by mouth Daily As Needed., Disp: , Rfl:     pantoprazole (PROTONIX) 40 MG EC tablet, Take 1 tablet by mouth Daily., Disp: , Rfl:     HPI    Zack Webster is a 82 y.o. male who presents today for a follow up of systolic congestive heart failure, and cardiac risk factors. Since last visit, patient has been doing well overall from a cardiovascular standpoint. He notes that PCP would like to decrease how much Entresto he is taking due to dizziness but the patient is hesitant.  He actually does not feel that dizzy.  Patient does not monitor his blood pressure regularly. He states he is feeling better than he has in a long time. Patient denies chest pain, shortness of breath, orthopnea, palpitations, edema, dizziness, and syncope.       The following portions of the patient's history were reviewed and updated as appropriate: allergies, current medications and problem list.    Pertinent positives as listed in the HPI.  All other systems reviewed are negative.         Vitals:    12/20/23 1349   BP: 118/62   BP Location: Left arm   Patient Position: Sitting   Pulse: 59   SpO2: 98%   Weight: 84.4 kg (186 lb)   Height: 177.8 cm (70\")       Physical Exam:  General: Alert and oriented.  Neck: Jugular venous pressure is within normal limits. Carotids have normal upstrokes without bruits.   Cardiovascular: Heart has a nondisplaced focal PMI. Regular rate and rhythm. No murmur, gallop or rub.  Lungs: Clear, no " rales or wheezes. Equal expansion is noted.   Extremities: Show no edema.  Skin: Warm and dry.  Neurologic: Nonfocal.     Diagnostic Data (reviewed with patient):  No recent laboratory studies available for review today.             ECG 12 Lead    Date/Time: 12/20/2023 2:07 PM  Performed by: Adwoa Reynoso MD    Authorized by: Adwoa Reynoso MD  Comparison: compared with previous ECG from 6/22/2022  Similar to previous ECG  Rhythm: sinus bradycardia  BPM: 59  Conduction: left bundle branch block    Clinical impression: abnormal EKG            Assessment:    ICD-10-CM ICD-9-CM   1. Chronic systolic congestive heart failure  I50.22 428.22     428.0   2. Aortic stenosis, severe  I35.0 424.1   3. Essential hypertension  I10 401.9   4. Mixed hyperlipidemia  E78.2 272.2         Plan:  Continue on aspirin 81 mg for antiplatelet therapy.   Continue on atorvastatin 80 mg daily for hyperlipidemia.   Continue on metoprolol 12.5 mg daily for rate control and hypertension.   Continue on Entresto  mg BID for hypertension and cardiomyopathy.   Continue all other current medications.  F/up in 12 months, sooner if needed.  Echo prior to follow-up visit limited for aortic valve gradients only.      Scribed for Adwoa Reynoso MD by Gladys Little. 12/20/2023 14:07 EST    I Adwoa Reynoso MD personally performed the services described in this documentation as scribed by the above individual in my presence, and it is both accurate and complete.    Adwoa Reynoso MD, [facility]

## 2024-01-15 RX ORDER — METOPROLOL SUCCINATE 25 MG/1
12.5 TABLET, EXTENDED RELEASE ORAL DAILY
Qty: 45 TABLET | Refills: 3 | Status: SHIPPED | OUTPATIENT
Start: 2024-01-15

## 2024-02-12 RX ORDER — SACUBITRIL AND VALSARTAN 97; 103 MG/1; MG/1
1 TABLET, FILM COATED ORAL 2 TIMES DAILY
Qty: 180 TABLET | Refills: 1 | Status: SHIPPED | OUTPATIENT
Start: 2024-02-12

## 2024-04-24 RX ORDER — ATORVASTATIN CALCIUM 80 MG/1
TABLET, FILM COATED ORAL
Qty: 90 TABLET | Refills: 3 | Status: SHIPPED | OUTPATIENT
Start: 2024-04-24

## 2024-07-08 RX ORDER — SACUBITRIL AND VALSARTAN 97; 103 MG/1; MG/1
TABLET, FILM COATED ORAL
Qty: 180 TABLET | Refills: 3 | Status: SHIPPED | OUTPATIENT
Start: 2024-07-08

## 2024-08-30 DIAGNOSIS — I35.9 AORTIC VALVE DISORDER: Primary | ICD-10-CM

## 2024-08-30 DIAGNOSIS — Z95.3 S/P TAVR (TRANSCATHETER AORTIC VALVE REPLACEMENT), BIOPROSTHETIC: ICD-10-CM

## 2024-11-26 RX ORDER — METOPROLOL SUCCINATE 25 MG/1
12.5 TABLET, EXTENDED RELEASE ORAL DAILY
Qty: 45 TABLET | Refills: 3 | Status: SHIPPED | OUTPATIENT
Start: 2024-11-26

## 2024-12-23 NOTE — PROGRESS NOTES
[facility] Cardiology    Patient ID: Zack Webster is a 84 y.o. male.  : 1940   Contact: [phone]    Encounter date: 2025    PCP: Aspen Rust MD      Chief complaint:   Chief Complaint   Patient presents with    Chronic systolic congestive heart failure       Problem List:  Aortic stenosis  Echo 2021, STJ: EF 35-40%, grade II diastolic dysfunction, moderate to severe MR, mild AI, moderate AS (mean PG 28.08 mmHg, peak 44.64 mmHg), moderate TR, RVSP 64.85 mmHg.   Cardiac cath, 2022, Noncritical LAD and RCA disease. Borderline stenosis in a moderate second diagonal branch estimated at 60% in artery barely 2 mm in diameter. 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 A a JL 5 was used for the left coronary circulation.  ALVINO, 2022; EF 25%. The left ventricular cavity is mildly dilated. Moderate to severe AS. Severe AS is suspected. Aortic annulus 2.7 cm. Peak velocity of the flow distal to the aortic valve is 364 cm/s. Aortic valve mean pressure gradient is 32 mmHg. Moderate to severe MR.   Carotid duplex, 2022; 0-49% stenosis of the CHRISTIANO and LICA. Bilateral heterogeneous carotid atherosclerosis greater on the left than the right.  Stress echo, 2022; EF 25%. The patient's heart rate increased from 62 bpm to only 70 bpm with the dobutamine infusion. The echocardiogram following dobutamine dosing showed a maximal velocity of 401 cm/s with a mean gradient of 40.5 mmHg. Calculated aortic valve area 0.7 cm². The stroke-volume increased from 56 to 75 mL. The aortic valve gradient remained unchanged. Based on the above criteria the patient qualifies for TAVR.  ALVINO, 2022; EF 25%. Global function severely impaired. Mild concentric LVH. Following placement of a 29 mm JUDI 3 pericardial prosthesis: No paravalvular leak is seen. Mean aortic valve gradient 6 mmHg. Calculated aortic valve area 1.92 cm².  Moderate to severe MR. Moderate dilation of the ascending aorta.  TAVR, 05/16/2022; Successful transcatheter aortic valve replacement with 29-2 mm  Melgar LIBAN 3 pericardial prosthesis. No acute procedure-related complications.  Echocardiogram, 06/22/2022: EF 25-30%, LV mild to moderately dilated, mild LVH, 29-2 Liban 3 TAVR present with mean PG of 15.7 mmHg (6 mmHg immediately post-op), RADHA 1.37 cm2, moderate MR, mild aortic root dilation (4.4 cm, 2.2 after correcting for BSA).   Echocardiogram, 10/05/2022: EF 25-30%. 29 mm Liban 3 pericardial prosthesis in the aortic position. Mean aortic valve gradient 15.3 mmHg.   Echocardiogram, 01/06/2023; EF 25-30%. 29 mm Liban prosthesis in the aortic position. AV mean PG 14 mmHg. Repeat in 3 months -  No change we will stop Warfarin and follow.   Echocardiogram, 04/19/2023; aortic valve mean valve gradient 15 mmHg.  Echo, 12/20/2023: LVEF estimated 30%.  29 mm LIBAN pericardial prosthesis present in the aortic position.  Mean aortic valve gradient 15 mmHg.  Mild to moderate MR.  Echocardiogram 1/8/2025: LVEF 35 to 40%.  29 mm LIBAN 3 pericardial prosthesis in place.  Mean aortic valve gradient 9.5 mmHg with a calculated aortic valve area of 1.5 cm².  Systolic heart failure  Echo 4/2021, STJ: EF 35-40%, grade II diastolic dysfunction, moderate to severe MR, mild AI, moderate AS (mean PG 28.08 mmHg, peak 44.64 mmHg), moderate TR, RVSP 64.85 mmHg.   Left bundle branch block  Hypertension  Hyperlipidemia  Hypothyroidism  GERD  Diabetes  Depression    Allergies   Allergen Reactions    Doxycycline Other (See Comments)     Uknown    Levaquin [Levofloxacin] Swelling       Current Medications:    Current Outpatient Medications:     aspirin 81 MG EC tablet, Take 1 tablet by mouth Daily., Disp: 30 tablet, Rfl: 11    atorvastatin (LIPITOR) 80 MG tablet, TAKE 1 TABLET BY MOUTH ONCE  DAILY, Disp: 90 tablet, Rfl: 3    docusate sodium (COLACE) 100 MG capsule, Take 1 capsule by  "mouth 2 (Two) Times a Day As Needed., Disp: , Rfl:     Entresto  MG tablet, TAKE 1 TABLET BY MOUTH TWICE  DAILY NEED UPDATED LABWORK, Disp: 180 tablet, Rfl: 3    Januvia 100 MG tablet, Take 1 tablet by mouth Daily., Disp: , Rfl:     levothyroxine (SYNTHROID, LEVOTHROID) 75 MCG tablet, Take 1 tablet by mouth Daily., Disp: , Rfl:     metoprolol succinate XL (TOPROL-XL) 25 MG 24 hr tablet, TAKE ONE-HALF TABLET BY MOUTH  DAILY, Disp: 45 tablet, Rfl: 3    omeprazole (priLOSEC) 40 MG capsule, Take 1 capsule by mouth Daily As Needed., Disp: , Rfl:     pantoprazole (PROTONIX) 40 MG EC tablet, Take 1 tablet by mouth Daily., Disp: , Rfl:     potassium chloride 10 MEQ CR tablet, Take 1 tablet by mouth As Needed., Disp: , Rfl:     torsemide (DEMADEX) 20 MG tablet, Take 1 tablet by mouth Daily As Needed., Disp: , Rfl:     HPI    Zack Webster is a 84 y.o. male who presents today for a follow up of systolic congestive heart failure, and cardiac risk factors. Since last visit, Zack has been doing well.  He is not doing much in the way of exercise but he is walking some.  He has felt better over the last 6 months.  Blood pressure and heart rate are very good at home.  He has had some renal insufficiency and minimal lower extremity edema.  He has not taken the Demadex and potassium every day.      The following portions of the patient's history were reviewed and updated as appropriate: allergies, current medications and problem list.    Pertinent positives as listed in the HPI.  All other systems reviewed are negative.         Vitals:    01/08/25 1310   BP: 110/54   BP Location: Left arm   Patient Position: Sitting   Pulse: 59   SpO2: 97%   Weight: 83.3 kg (183 lb 9.6 oz)   Height: 177.8 cm (70\")       Physical Exam:  General: Alert and oriented.  Neck: Jugular venous pressure is within normal limits. Carotids have normal upstrokes without bruits.   Cardiovascular: Heart has a nondisplaced focal PMI. Regular rate and rhythm. No " murmur, gallop or rub.  Lungs: Clear, no rales or wheezes. Equal expansion is noted.   Extremities: Show no edema.  Skin: Warm and dry.  Neurologic: Nonfocal.     Diagnostic Data (reviewed with patient):    No recent laboratory studies available for review today.     Advance Care Planning   ACP discussion was held with the patient during this visit. Patient does not have an advance directive, declines further assistance.         ECG 12 Lead    Date/Time: 1/8/2025 1:30 PM  Performed by: Adwoa Reynoso MD    Authorized by: Adwoa Reynoso MD  Comparison: compared with previous ECG from 12/20/2023  Similar to previous ECG  Comparison to previous ECG: First-degree AV block now present.            Assessment:    ICD-10-CM ICD-9-CM   1. Chronic systolic congestive heart failure  I50.22 428.22     428.0   2. Aortic stenosis, severe  I35.0 424.1   3. Essential hypertension  I10 401.9   4. Mixed hyperlipidemia  E78.2 272.2         Plan:  Try not to take the Demadex and potassium every day.  I told him to shoot for 3 days weekly.  He understands that the Demadex and potassium go together.  Continue on aspirin 81 mg daily for antiplatelet therapy.   Continue on atorvastatin 80 mg daily for hyperlipidemia.    Continue on Entresto  mg BID for hypertension and cardiomyopathy.    Continue on metoprolol 25 mg daily for rate control and hypertension.      Continue all other current medications.  F/up in 12 months, sooner if needed.  Echo same day or prior to visit limited for TAVR evaluation and EF      Adwoa Reynoso MD, FACC

## 2025-01-08 ENCOUNTER — HOSPITAL ENCOUNTER (OUTPATIENT)
Dept: CARDIOLOGY | Facility: HOSPITAL | Age: 85
Discharge: HOME | End: 2025-01-08
Admitting: INTERNAL MEDICINE
Payer: MEDICARE

## 2025-01-08 ENCOUNTER — OFFICE VISIT (OUTPATIENT)
Dept: CARDIOLOGY | Facility: CLINIC | Age: 85
End: 2025-01-08
Payer: MEDICARE

## 2025-01-08 VITALS
WEIGHT: 183.6 LBS | BODY MASS INDEX: 26.28 KG/M2 | HEIGHT: 70 IN | HEART RATE: 59 BPM | SYSTOLIC BLOOD PRESSURE: 110 MMHG | OXYGEN SATURATION: 97 % | DIASTOLIC BLOOD PRESSURE: 54 MMHG

## 2025-01-08 DIAGNOSIS — I10 ESSENTIAL HYPERTENSION: ICD-10-CM

## 2025-01-08 DIAGNOSIS — I35.9 AORTIC VALVE DISORDER: ICD-10-CM

## 2025-01-08 DIAGNOSIS — I50.22 CHRONIC SYSTOLIC CONGESTIVE HEART FAILURE: Primary | ICD-10-CM

## 2025-01-08 DIAGNOSIS — Z95.3 S/P TAVR (TRANSCATHETER AORTIC VALVE REPLACEMENT), BIOPROSTHETIC: ICD-10-CM

## 2025-01-08 DIAGNOSIS — E78.2 MIXED HYPERLIPIDEMIA: ICD-10-CM

## 2025-01-08 DIAGNOSIS — I35.0 AORTIC STENOSIS, SEVERE: ICD-10-CM

## 2025-01-08 DIAGNOSIS — Z95.3 S/P TAVR (TRANSCATHETER AORTIC VALVE REPLACEMENT), BIOPROSTHETIC: Primary | ICD-10-CM

## 2025-01-08 LAB
AORTIC DIMENSIONLESS INDEX: 0.39 (DI)
ASCENDING AORTA: 4.5 CM
AV MEAN PRESS GRAD SYS DOP V1V2: 10 MMHG
AV VMAX SYS DOP: 214.6 CM/SEC
BH CV ECHO MEAS - AO MAX PG: 18.5 MMHG
BH CV ECHO MEAS - AO ROOT DIAM: 2.7 CM
BH CV ECHO MEAS - AO V2 VTI: 41.4 CM
BH CV ECHO MEAS - AVA(I,D): 1.5 CM2
BH CV ECHO MEAS - IVSD: 1.4 CM
BH CV ECHO MEAS - LA DIMENSION: 3.2 CM
BH CV ECHO MEAS - LAT PEAK E' VEL: 4.3 CM/SEC
BH CV ECHO MEAS - LV MAX PG: 2.41 MMHG
BH CV ECHO MEAS - LV MEAN PG: 1.3 MMHG
BH CV ECHO MEAS - LV V1 MAX: 77.7 CM/SEC
BH CV ECHO MEAS - LV V1 VTI: 17.4 CM
BH CV ECHO MEAS - LVIDD: 4.1 CM
BH CV ECHO MEAS - LVIDS: 3.2 CM
BH CV ECHO MEAS - LVOT DIAM: 2.3 CM
BH CV ECHO MEAS - LVPWD: 1.2 CM
BH CV ECHO MEAS - MED PEAK E' VEL: 4.9 CM/SEC
BH CV ECHO MEAS - MV A MAX VEL: 100.9 CM/SEC
BH CV ECHO MEAS - MV DEC SLOPE: 324.3 CM/SEC2
BH CV ECHO MEAS - MV E MAX VEL: 56.6 CM/SEC
BH CV ECHO MEAS - MV E/A: 0.56
BH CV ECHO MEAS - MV MAX PG: 6.8 MMHG
BH CV ECHO MEAS - MV MEAN PG: 2.1 MMHG
BH CV ECHO MEAS - MV P1/2T: 79 MSEC
BH CV ECHO MEAS - MV V2 VTI: 38.2 CM
BH CV ECHO MEAS - RAP SYSTOLE: 3 MMHG
BH CV ECHO MEAS - RVSP: 25 MMHG
BH CV ECHO MEAS - TAPSE (>1.6): 2.08 CM
BH CV ECHO MEAS - TR MAX PG: 22 MMHG
BH CV ECHO MEAS - TR MAX VEL: 234.3 CM/SEC
BH CV ECHO MEASUREMENTS AVERAGE E/E' RATIO: 12.3
BH CV XLRA - RV BASE: 3 CM
BH CV XLRA - RV LENGTH: 7 CM
BH CV XLRA - RV MID: 2.6 CM
BH CV XLRA - TDI S': 7.1 CM/SEC
LEFT ATRIUM VOLUME INDEX: 17.8 ML/M2
LV EF 2D ECHO EST: 40 %
LV EF BIPLANE MOD: 44 %
MV VALVE AREA BY PLANIMETRY: 1.43 CM2

## 2025-01-08 PROCEDURE — 93306 TTE W/DOPPLER COMPLETE: CPT

## 2025-01-08 RX ORDER — METOPROLOL TARTRATE 25 MG/1
12.5 TABLET, FILM COATED ORAL 2 TIMES DAILY
COMMUNITY
End: 2025-01-08

## 2025-03-10 DIAGNOSIS — E78.2 MIXED HYPERLIPIDEMIA: ICD-10-CM

## 2025-03-10 DIAGNOSIS — I25.810 CORONARY ARTERY DISEASE INVOLVING CORONARY BYPASS GRAFT OF NATIVE HEART WITHOUT ANGINA PECTORIS: Primary | ICD-10-CM

## 2025-03-10 RX ORDER — ATORVASTATIN CALCIUM 80 MG/1
80 TABLET, FILM COATED ORAL DAILY
Qty: 30 TABLET | Refills: 0 | Status: SHIPPED | OUTPATIENT
Start: 2025-03-10

## 2025-03-10 NOTE — TELEPHONE ENCOUNTER
"Patient needs lab work for further refills.  Unable to request from PCP as \"undeliverable address\".    "

## 2025-04-23 RX ORDER — ATORVASTATIN CALCIUM 80 MG/1
80 TABLET, FILM COATED ORAL DAILY
Qty: 30 TABLET | Refills: 0 | Status: SHIPPED | OUTPATIENT
Start: 2025-04-23

## 2025-04-23 NOTE — TELEPHONE ENCOUNTER
8/14/24  Glu 164  BUN 28  Creat 1.39  Na 144  K+ 4.6  Cl 110  CO2 23  Calcium 9.0  Protein  5.6  Albumin 3.7  Globulin 1.9  Total Bili 0.4  Alk Phos 42  AST  16  ALT  16    Patient needs lipid panel for further refills of atorvastatin.

## 2025-05-12 RX ORDER — ATORVASTATIN CALCIUM 80 MG/1
80 TABLET, FILM COATED ORAL DAILY
Qty: 30 TABLET | Refills: 11 | Status: SHIPPED | OUTPATIENT
Start: 2025-05-12

## 2025-05-12 RX ORDER — SACUBITRIL AND VALSARTAN 97; 103 MG/1; MG/1
TABLET, FILM COATED ORAL
Qty: 180 TABLET | Refills: 3 | Status: SHIPPED | OUTPATIENT
Start: 2025-05-12

## (undated) DEVICE — GW CERBRL FC PTFE STD/STR .035 260CM

## (undated) DEVICE — SUT PROLN 4/0 BB D/A 36IN 8581H

## (undated) DEVICE — GLV SURG PREMIERPRO MIC LTX PF SZ6.5 BRN

## (undated) DEVICE — BOOT SUT XRAY DETECT STD YEL/BLU CA/50

## (undated) DEVICE — CATH DIAG EXPO .056 FL5 6F 100CM

## (undated) DEVICE — SUT MONOCRYL PLS ANTIB UND 3/0  PS1 27IN

## (undated) DEVICE — 3M™ STERI-DRAPE™ INSTRUMENT POUCH 1018: Brand: STERI-DRAPE™

## (undated) DEVICE — ANTIBACTERIAL UNDYED BRAIDED (POLYGLACTIN 910), SYNTHETIC ABSORBABLE SUTURE: Brand: COATED VICRYL

## (undated) DEVICE — CATH PACE PACEL BIPOL 5F110CM

## (undated) DEVICE — CATH DIAG EXPO M/ PK 6FR FL4/FR4 PIG 3PK

## (undated) DEVICE — PROVIDES A STERILE INTERFACE BETWEEN THE OPERATING ROOM SURGICAL LAMPS (NON-STERILE) AND THE SURGEON OR NURSE (STERILE).: Brand: STERION®CLAMP COVER FABRIC

## (undated) DEVICE — SUT SILK 3/0 TIES 18IN A184H

## (undated) DEVICE — COVER,TABLE,HVY DUTY,60"X90",STRL: Brand: MEDLINE

## (undated) DEVICE — PK ATS CUST W CARDIOTOMY RESEVOIR

## (undated) DEVICE — TBG PENCL TELESCP MEGADYNE SMOKE EVAC 10FT

## (undated) DEVICE — SLV REPOSTNG CATH STRL 60CM

## (undated) DEVICE — SUT SILK 0 SH 30IN K834H

## (undated) DEVICE — ANGIOGRAPHIC CATHETER: Brand: EXPO™

## (undated) DEVICE — PK CATH CARD 10

## (undated) DEVICE — COVER,MAYO STAND,XL,STERILE: Brand: MEDLINE

## (undated) DEVICE — INTENDED FOR TISSUE SEPARATION, AND OTHER PROCEDURES THAT REQUIRE A SHARP SURGICAL BLADE TO PUNCTURE OR CUT.: Brand: BARD-PARKER ® STAINLESS STEEL BLADES

## (undated) DEVICE — SYR LUERLOK 30CC

## (undated) DEVICE — SENSR CERBRL O2 PK/2

## (undated) DEVICE — SUT SILK 4/0 TIES 18IN A183H

## (undated) DEVICE — BLANKT WARM UNDER/BDY A/ 100X195CM DISP LF

## (undated) DEVICE — SUT SILK 2/0 TIES 18IN A185H

## (undated) DEVICE — SYR LUERLOK 50ML

## (undated) DEVICE — AIRLIFE™ UNIVERSAL OXYGEN NUT AND NIPPLE CONNECTION: Brand: AIRLIFE™

## (undated) DEVICE — SUCTION CANISTER 2500CC: Brand: DEROYAL

## (undated) DEVICE — ELECTRD DEFIB M/FUNC PROPADZ STRL 2PK

## (undated) DEVICE — PK MINOR SPLT 10

## (undated) DEVICE — GW PERIPH VASC ADX J/TP SS .035 150CM 3MM

## (undated) DEVICE — ADHS SKIN PREMIERPRO EXOFIN TOPICAL HI/VISC .5ML

## (undated) DEVICE — PINNACLE INTRODUCER SHEATH: Brand: PINNACLE

## (undated) DEVICE — SUT PROLN 6/0 C1 D/A 30IN 8706H

## (undated) DEVICE — ELECTRD BLD EZ CLN STD 2.5IN

## (undated) DEVICE — PAD ARMBRD SURG CONVOL 7.5X20X2IN

## (undated) DEVICE — CATH DIAG EXPO .056 AL1 6F 100CM

## (undated) DEVICE — PATIENT RETURN ELECTRODE, SINGLE-USE, CONTACT QUALITY MONITORING, ADULT, WITH 9FT CORD, FOR PATIENTS WEIGING OVER 33LBS. (15KG): Brand: MEGADYNE

## (undated) DEVICE — SI AVANTI+ 7F STD W/GW  NO OBT: Brand: AVANTI

## (undated) DEVICE — BOWL UTIL STRL 32OZ

## (undated) DEVICE — 3M™ IOBAN™ 2 ANTIMICROBIAL INCISE DRAPE 6651EZ: Brand: IOBAN™ 2

## (undated) DEVICE — CATH DIAG EXPO .056 FL3.5 6F 100CM

## (undated) DEVICE — 3M™ STERI-DRAPE™ FLUOROSCOPE DRAPE, 10 PER CARTON / 4 CARTONS PER CASE, 1012: Brand: STERI-DRAPE™

## (undated) DEVICE — PENCL ROCKRSWCH MEGADYNE W/HOLSTR 10FT SS

## (undated) DEVICE — GLIDEX™ COATED HYDROPHILIC GUIDEWIRE: Brand: MAGIC TORQUE™

## (undated) DEVICE — RADIFOCUS GLIDEWIRE: Brand: GLIDEWIRE

## (undated) DEVICE — HDRST POSTIN FM CRDL TRACH SLOT 9X8X4IN

## (undated) DEVICE — CABL PACE ATRIAL PT BLU

## (undated) DEVICE — INTRO SHEATH PRELUDE IDEAL SPRNG COIL .021 6F 16X80CM

## (undated) DEVICE — TRAP FLD MINIVAC MEGADYNE 100ML

## (undated) DEVICE — CLTH CLENS READYCLEANSE PERI CARE PK/5

## (undated) DEVICE — KT MANIFOLD CATHLAB CUST

## (undated) DEVICE — DECANTER BAG 9": Brand: MEDLINE INDUSTRIES, INC.

## (undated) DEVICE — SUT PROLN 7/0 BV1 24IN 4PK M8702

## (undated) DEVICE — CATH GUIDE BERN 5F 65CM

## (undated) DEVICE — DRAPE,TOP,102X53,STERILE: Brand: MEDLINE

## (undated) DEVICE — GW AMPLTZ SUPERSTIFF STR .035IN 180CM

## (undated) DEVICE — APPL CHLORAPREP TINTED 26ML TEAL

## (undated) DEVICE — SHEET, DRAPE, SPLIT, STERILE: Brand: MEDLINE

## (undated) DEVICE — NDL PERC 1PRT THNWALL W/BASEPLT 18G 7CM

## (undated) DEVICE — GW SAFARI2 PRESH XSM CRV .035IN 3.2X2.9X275CM

## (undated) DEVICE — Device